# Patient Record
Sex: FEMALE | Race: WHITE | Employment: OTHER | URBAN - METROPOLITAN AREA
[De-identification: names, ages, dates, MRNs, and addresses within clinical notes are randomized per-mention and may not be internally consistent; named-entity substitution may affect disease eponyms.]

---

## 2017-11-13 ENCOUNTER — HOSPITAL ENCOUNTER (OUTPATIENT)
Dept: MAMMOGRAPHY | Age: 67
Discharge: HOME | End: 2017-11-13
Attending: OBSTETRICS & GYNECOLOGY | Admitting: OBSTETRICS & GYNECOLOGY

## 2017-11-13 DIAGNOSIS — Z12.31 VISIT FOR SCREENING MAMMOGRAM: ICD-10-CM

## 2018-06-13 ENCOUNTER — HOSPITAL ENCOUNTER (OUTPATIENT)
Dept: ULTRASOUND IMAGING | Age: 68
Discharge: HOME | End: 2018-06-13
Attending: OBSTETRICS & GYNECOLOGY | Admitting: OBSTETRICS & GYNECOLOGY

## 2018-06-13 DIAGNOSIS — N63.20 BREAST MASS, LEFT: ICD-10-CM

## 2018-06-18 ENCOUNTER — HOSPITAL ENCOUNTER (OUTPATIENT)
Dept: ULTRASOUND IMAGING | Age: 68
Discharge: HOME | End: 2018-06-18
Attending: OBSTETRICS & GYNECOLOGY | Admitting: OBSTETRICS & GYNECOLOGY

## 2018-06-18 DIAGNOSIS — N63.0 BREAST NODULE: ICD-10-CM

## 2018-12-06 ENCOUNTER — HOSPITAL ENCOUNTER (OUTPATIENT)
Dept: ULTRASOUND IMAGING | Age: 68
Discharge: HOME | End: 2018-12-06
Payer: MEDICARE

## 2018-12-06 ENCOUNTER — HOSPITAL ENCOUNTER (OUTPATIENT)
Dept: MAMMOGRAPHY | Age: 68
Discharge: HOME | End: 2018-12-06
Payer: MEDICARE

## 2018-12-06 DIAGNOSIS — N64.9 LESION OF BREAST: ICD-10-CM

## 2018-12-06 DIAGNOSIS — Z12.31 VISIT FOR SCREENING MAMMOGRAM: ICD-10-CM

## 2018-12-06 PROCEDURE — 77067 SCR MAMMO BI INCL CAD: CPT

## 2018-12-06 PROCEDURE — 76642 ULTRASOUND BREAST LIMITED: CPT

## 2019-12-09 ENCOUNTER — HOSPITAL ENCOUNTER (OUTPATIENT)
Dept: MAMMOGRAPHY | Age: 69
Discharge: HOME | End: 2019-12-09
Payer: MEDICARE

## 2019-12-09 DIAGNOSIS — Z12.31 VISIT FOR SCREENING MAMMOGRAM: ICD-10-CM

## 2019-12-09 PROCEDURE — 77063 BREAST TOMOSYNTHESIS BI: CPT

## 2021-04-13 ENCOUNTER — HOSPITAL ENCOUNTER (OUTPATIENT)
Dept: MAMMOGRAPHY | Age: 71
Discharge: HOME | End: 2021-04-13
Payer: MEDICARE

## 2021-04-13 VITALS — WEIGHT: 145 LBS | HEIGHT: 65 IN | BODY MASS INDEX: 24.16 KG/M2

## 2021-04-13 DIAGNOSIS — Z12.31 VISIT FOR SCREENING MAMMOGRAM: ICD-10-CM

## 2021-04-13 PROCEDURE — 77063 BREAST TOMOSYNTHESIS BI: CPT

## 2022-04-14 ENCOUNTER — HOSPITAL ENCOUNTER (OUTPATIENT)
Dept: WOMENS IMAGING | Age: 72
Discharge: HOME | End: 2022-04-14
Payer: MEDICARE

## 2022-04-14 DIAGNOSIS — Z12.31 VISIT FOR SCREENING MAMMOGRAM: ICD-10-CM

## 2022-04-14 PROCEDURE — 77063 BREAST TOMOSYNTHESIS BI: CPT

## 2022-05-15 ENCOUNTER — CLINICAL DOCUMENTATION (OUTPATIENT)
Dept: OTHER | Age: 72
End: 2022-05-15

## 2022-06-23 ENCOUNTER — TELEPHONE (OUTPATIENT)
Dept: ORTHOPEDIC SURGERY | Age: 72
End: 2022-06-23

## 2022-06-23 NOTE — TELEPHONE ENCOUNTER
Attempted to contact patient to inform them about the [facility] [address] joint pain program.    To provide optimal care, [facility], in collaboration with our Primary Care Providers, are excited to update our [facility] ([facility]) patients on our joint pain program.    In the past have you received injections in your knees? Or are you currently experiencing knee pain that is impacting your daily activities or quality of life? For more information about what [facility] [address] can offer to you or to set up an appointment with a joint pain specialist, please call us back at [phone].

## 2022-11-16 ENCOUNTER — OFFICE VISIT (OUTPATIENT)
Dept: ORTHOPEDIC SURGERY | Age: 72
End: 2022-11-16
Payer: MEDICARE

## 2022-11-16 VITALS — BODY MASS INDEX: 23.4 KG/M2 | HEIGHT: 66 IN

## 2022-11-16 DIAGNOSIS — M25.561 RIGHT KNEE PAIN, UNSPECIFIED CHRONICITY: Primary | ICD-10-CM

## 2022-11-16 DIAGNOSIS — M17.11 PRIMARY OSTEOARTHRITIS OF RIGHT KNEE: ICD-10-CM

## 2022-11-16 PROCEDURE — G8484 FLU IMMUNIZE NO ADMIN: HCPCS | Performed by: ORTHOPAEDIC SURGERY

## 2022-11-16 PROCEDURE — G8400 PT W/DXA NO RESULTS DOC: HCPCS | Performed by: ORTHOPAEDIC SURGERY

## 2022-11-16 PROCEDURE — 20610 DRAIN/INJ JOINT/BURSA W/O US: CPT | Performed by: ORTHOPAEDIC SURGERY

## 2022-11-16 PROCEDURE — 1123F ACP DISCUSS/DSCN MKR DOCD: CPT | Performed by: ORTHOPAEDIC SURGERY

## 2022-11-16 PROCEDURE — 3017F COLORECTAL CA SCREEN DOC REV: CPT | Performed by: ORTHOPAEDIC SURGERY

## 2022-11-16 PROCEDURE — 1036F TOBACCO NON-USER: CPT | Performed by: ORTHOPAEDIC SURGERY

## 2022-11-16 PROCEDURE — G8420 CALC BMI NORM PARAMETERS: HCPCS | Performed by: ORTHOPAEDIC SURGERY

## 2022-11-16 PROCEDURE — 1090F PRES/ABSN URINE INCON ASSESS: CPT | Performed by: ORTHOPAEDIC SURGERY

## 2022-11-16 PROCEDURE — G8428 CUR MEDS NOT DOCUMENT: HCPCS | Performed by: ORTHOPAEDIC SURGERY

## 2022-11-16 PROCEDURE — 99204 OFFICE O/P NEW MOD 45 MIN: CPT | Performed by: ORTHOPAEDIC SURGERY

## 2022-11-16 RX ORDER — LIDOCAINE HYDROCHLORIDE 10 MG/ML
20 INJECTION, SOLUTION INFILTRATION; PERINEURAL ONCE
Status: COMPLETED | OUTPATIENT
Start: 2022-11-16 | End: 2022-11-16

## 2022-11-16 RX ORDER — METHYLPREDNISOLONE ACETATE 40 MG/ML
40 INJECTION, SUSPENSION INTRA-ARTICULAR; INTRALESIONAL; INTRAMUSCULAR; SOFT TISSUE ONCE
Status: COMPLETED | OUTPATIENT
Start: 2022-11-16 | End: 2022-11-16

## 2022-11-16 RX ADMIN — LIDOCAINE HYDROCHLORIDE 20 ML: 10 INJECTION, SOLUTION INFILTRATION; PERINEURAL at 17:22

## 2022-11-16 RX ADMIN — METHYLPREDNISOLONE ACETATE 40 MG: 40 INJECTION, SUSPENSION INTRA-ARTICULAR; INTRALESIONAL; INTRAMUSCULAR; SOFT TISSUE at 17:23

## 2022-11-16 NOTE — PROGRESS NOTES
Date:  2022    Name:  Altaf Carbone  Address:  [address] [place] [address] [place] [address]    :  1950      Age:   71 y.o. Medical Record Number:  [id number]    Reason for Visit:    Chief Complaint    Knee Pain (Right knee)      DOS:2022     HPI: Bebeto De La Garza is a 71 y.o. female here today for right knee assessment  She states that she has had right knee pain for a few weeks localized to the medial joint line. She states that last year she was seen for her left knee at [facility] where she had a cement plasty for stress fracture in the medial tibial plateau. In addition to medial meniscus tear             ROS: All systems reviewed on patient intake form. Pertinent items are noted in HPI. No past medical history on file. No past surgical history on file. Family History   Problem Relation Age of Onset    Breast Cancer Paternal Aunt     Breast Cancer Paternal Aunt        Social History     Socioeconomic History    Marital status:    Tobacco Use    Smoking status: Never    Smokeless tobacco: Never   Vaping Use    Vaping Use: Unknown       No current outpatient medications on file. No current facility-administered medications for this visit. Not on File    Vital signs: There were no vitals taken for this visit. Right knee exam    Gait: No use of assistive devices. No antalgic gait. Alignment: normal alignment. Inspection/skin: Skin is intact without erythema or ecchymosis. No gross deformity. Palpation: Patellofemoral crepitus. Pain to palpation of medial joint line    Range of Motion: There is full range of motion. Strength: Normal quadriceps development. Effusion: No effusion or swelling present. Ligamentous stability: No cruciate or collateral ligament instability. Neurologic and vascular: Skin is warm and well-perfused. Sensation is intact to light-touch. Special tests: Negative Karlee sign.   Pain with and 4 mL of 0.5% ropivacaine (Naropin) were placed in the knee after it was prepped with chlorhexidine. This resulted in good relief of symptoms. There were no complications. The patient was advised to ice the knee this evening and to avoid vigorous activities for the next 2 days. They were advised to call us if there was any erythema, enduration, swelling or increasing pain. ..  Gerhardt Corolla is in agreement with this plan. All questions were answered to patient's satisfaction and was encouraged to call with any further questions. The patient was advised that NSAID-type medications have several potential side effects that include: gastrointestinal irritation including hemorrhage, renal injury, as well as an increased risk for heart attack and stroke. The patient was asked to take the medication with food and to stop if there is any symptoms of GI upset, including heartburn, nausea, increased gas or diarrhea. I asked the patient to contact their medical provider for vomiting, abdominal pain or black/bloody stools. The patient should have renal function testing per his medical provider periodically if the medication is taken on a regular basis. The patient should be alert for any swelling in the lower extremities and should stop taking the medication immediately and contact their medical provider should this occur. In addition, the patient should stop taking the medication immediately and contact their medical provider should there be any shortness of breath, fatigue and be evaluated in an emergency facility for any chest pain. The patient expresses understanding of these issues and questions were answered. Nasra Crespo MD [facility]    Orthopaedic Surgeon, Clinical Fellow  [address]   On behalf of      Total time spent for evaluation, education, and development of treatment plan: 49 minutes.     I attest that I met personally with the patient, performed the described exam, reviewed the radiographic studies and medical records associated with this patient and supervised the services that are described above.      Palmira Zamora MD

## 2022-11-29 ENCOUNTER — HOSPITAL ENCOUNTER (OUTPATIENT)
Dept: PHYSICAL THERAPY | Age: 72
Setting detail: THERAPIES SERIES
Discharge: HOME | End: 2022-11-29
Payer: MEDICARE

## 2022-11-29 PROCEDURE — 97112 NEUROMUSCULAR REEDUCATION: CPT

## 2022-11-29 PROCEDURE — 97110 THERAPEUTIC EXERCISES: CPT

## 2022-11-29 PROCEDURE — 97161 PT EVAL LOW COMPLEX 20 MIN: CPT

## 2022-11-29 NOTE — FLOWSHEET NOTE
The [facility] and [facility], [facility] [address], [address] [facility]  Phone: [phone]   Fax:     [phone]    Physical Therapy Daily Treatment Note  Date:  2022    Patient Name:  Killian Haskins    :  1950  MRN: [id number]  Restrictions/Precautions:    Medical/Treatment Diagnosis Information:  Diagnosis: M25.561 (ICD-10-CM) - Right knee pain, unspecified chronicity  Treatment Diagnosis: M25.561 (ICD-10-CM) - Right knee pain, unspecified chronicity  Insurance/Certification information:     Physician Information:     Has the plan of care been signed (Y/N):        []  Yes  [x]  No     Date of Patient follow up with Physician:       Is this a Progress Report:     []  Yes  [x]  No        If Yes:  Date Range for reporting period:  Beginning   Ending    Progress report will be due (10 Rx or 30 days whichever is less):       Recertification will be due (POC Duration  / 90 days whichever is less):         Visit # Insurance Allowable Auth Required   1 MN []  Yes [x]  No        Functional Scale: FOTO NV    Date assessed:       Latex Allergy:  [x]NO      []YES  Preferred Language for Healthcare:   [x]English       []other:    Pain level:  0-5-6/10     SUBJECTIVE:  See eval    OBJECTIVE: See eval  Observation:   Test measurements:      RESTRICTIONS/PRECAUTIONS:     Exercises/Interventions:   Exercise/Equipment Resistance/Repetitions Other comments   Stretching     Hamstring     Towel Pull     Calf @ wall 3 x 30\"    Hip Flexion     ITB     Groin     Quad                    SLR     Supine 2 x 10    Abduction     Adduction     Prone     SLR+          Isometrics     Quad sets 2 x 10 x 5\"         Patellar Glides     Medial     Superior     Inferior          ROM     Sheet Pulls     Hang Weights     Passive     Active     Weight Shift     Ankle Pumps                    CKC     Calf raises     Wall sits     Step ups     1 leg stand     Squatting     CC TKE     Balance     bridges 2 x 10 11/29        PRE     Extension  RANGE:   Flexion  RANGE:        Quantum machines     Leg press      Leg extension     Leg curl          Manual interventions                     Therapeutic Exercise and NMR EXR  [x] (97782) Provided verbal/tactile cueing for activities related to strengthening, flexibility, endurance, ROM for improvements in LE, proximal hip, and core control with self care, mobility, lifting, ambulation. [x] (49822) Provided verbal/tactile cueing for activities related to improving balance, coordination, kinesthetic sense, posture, motor skill, proprioception  to assist with LE, proximal hip, and core control in self care, mobility, lifting, ambulation and eccentric single leg control.      NMR and Therapeutic Activities:    [] (02815 or 09821) Provided verbal/tactile cueing for activities related to improving balance, coordination, kinesthetic sense, posture, motor skill, proprioception and motor activation to allow for proper function of core, proximal hip and LE with self care and ADLs  [] (53768) Gait Re-education- Provided training and instruction to the patient for proper LE, core and proximal hip recruitment and positioning and eccentric body weight control with ambulation re-education including up and down stairs     Home Exercise Program:    [x] (78788) Reviewed/Progressed HEP activities related to strengthening, flexibility, endurance, ROM of core, proximal hip and LE for functional self-care, mobility, lifting and ambulation/stair navigation   [] (90485)Reviewed/Progressed HEP activities related to improving balance, coordination, kinesthetic sense, posture, motor skill, proprioception of core, proximal hip and LE for self care, mobility, lifting, and ambulation/stair navigation      Manual Treatments:  PROM / STM / Oscillations-Mobs:  G-I, II, III, IV (PA's, Inf., Post.)  [] (13183) Provided manual therapy to mobilize LE, proximal hip and/or LS spine soft tissue/joints for the purpose of modulating pain, promoting relaxation,  increasing ROM, reducing/eliminating soft tissue swelling/inflammation/restriction, improving soft tissue extensibility and allowing for proper ROM for normal function with self care, mobility, lifting and ambulation. Modalities:      Charges:  Timed Code Treatment Minutes: 25   Total Treatment Minutes: 45       [x] EVAL (LOW) 74376 (typically 20 minutes face-to-face)  [] EVAL (MOD) 50975 (typically 30 minutes face-to-face)  [] EVAL (HIGH) 56830 (typically 45 minutes face-to-face)  [] RE-EVAL   [x] LP(18014) x  1   [] IONTO  [x] NMR (20185) x     [] VASO  [] Manual (90901) x      [] Other:  [] TA x      [] Mech Traction (05518)  [] ES(attended) (06133)      [] ES (un) (74722):     GOALS:     Short Term Goals: To be achieved in: 2 weeks  1. Independent in HEP and progression per patient tolerance, in order to prevent re-injury. [] Progressing: [] Met: [] Not Met: [] Adjusted   2. Patient will have a decrease in pain by one grade to facilitate improvement in movement, function, and ADLs as indicated by Functional Deficits. [] Progressing: [] Met: [] Not Met: [] Adjusted    Long Term Goals: To be achieved in: 6 weeks  1. FOTO score improved by 50% to assist with reaching prior level of function. [] Progressing: [] Met: [] Not Met: [] Adjusted  2. Patient will demonstrate increased AROM and flexibility to WNL to allow for proper joint functioning as indicated by patients Functional Deficits. [] Progressing: [] Met: [] Not Met: [] Adjusted  3. Patient will demonstrate an increase in RLE Strength to 5/5  to allow for proper functional mobility as indicated by patients Functional Deficits. [] Progressing: [] Met: [] Not Met: [] Adjusted  4. Patient will return to walking and all functional activities without restriction or increase in symptoms  [] Progressing: [] Met: [] Not Met: [] Adjusted  5. Patient will ascend/descend 12 stairs with reciprocal pattern without pain or symptoms  [] Progressing: [] Met: [] Not Met: [] Adjusted     Overall Progression Towards Functional goals/ Treatment Progress Update:  [] Patient is progressing as expected towards functional goals listed. [] Progression is slowed due to complexities/Impairments listed. [] Progression has been slowed due to co-morbidities. [x] Plan just implemented, too soon to assess goals progression <30days   [] Goals require adjustment due to lack of progress  [] Patient is not progressing as expected and requires additional follow up with physician  [] Other    Prognosis for POC: [x] Good [] Fair  [] Poor      Patient requires continued skilled intervention: [x] Yes  [] No    Treatment/Activity Tolerance:  [x] Patient able to complete treatment  [] Patient limited by fatigue  [] Patient limited by pain     [] Patient limited by other medical complications  [] Other:     Patient Education:   Patient educated on diagnosis, prognosis, POC, HEP           Access Code: [email or web address]        PLAN: See eval  [] Continue per plan of care [] Alter current plan (see comments above)  [x] Plan of care initiated [] Hold pending MD visit [] Discharge      Electronically signed by:  Lindsey Pond, PT, DPT, CSCS    Note: If patient does not return for scheduled/ recommended follow up visits, this note will serve as a discharge from care along with most recent update on progress.

## 2022-11-29 NOTE — PLAN OF CARE
[address]  Phone [phone]  Fax [phone]                                                       Physical Therapy Certification    Dear Dr. Platt ,    We had the pleasure of evaluating the following patient for physical therapy services at [address] [place] [address]. A summary of our findings can be found in the initial assessment below. This includes our plan of care. If you have any questions or concerns regarding these findings, please do not hesitate to contact me at the office phone number checked above. Thank you for the referral.       Physician Signature:_______________________________Date:__________________  By signing above (or electronic signature), therapists plan is approved by physician      Patient: Eneida Porter   : 1950   MRN: [id number]  Referring Physician:  Dr. Yong Warner      Evaluation Date: 2022      Medical Diagnosis Information:  Diagnosis: M25.561 (ICD-10-CM) - Right knee pain, unspecified chronicity   Treatment Diagnosis: M25.561 (ICD-10-CM) - Right knee pain, unspecified chronicity                                         Insurance information:       Precautions/ Contra-indications:     C-SSRS Triggered by Intake questionnaire (Past 2 wk assessment):   [x] No, Questionnaire did not trigger screening.   [] Yes, Patient intake triggered further evaluation      [] C-SSRS Screening completed  [] PCP notified via Plan of Care  [] Emergency services notified     Latex Allergy:  [x]NO      []YES  Preferred Language for Healthcare:   [x]English       []other:    SUBJECTIVE: Patient states that in September her knee started to bother her and denies a PETE.  Patient states she had a long drive down to [facility] which she thinks aggravated it and continues to notice on longer distance drives her knee tenses up and feels sore. Patient states that when going on walks on the inside of the knee pain would come and go. Patient states when she uses it she notices it hurts more and has progressively worsened Patient states she saw her doctor who gave her a cortisone shot and recommended PT. Relevant Medical History: stress fx left lower leg, HTN, OA  Functional Disability Index: FOTO NV    Pain Scale: 0-5-6/10  Easing factors: cortisone shot, ice, stretching in hot tub  Provocative factors: stairs, squatting, kneeling    Type: []Constant   []Intermittent  []Radiating []Localized []other: sharp     Numbness/Tingling: denies n/t     Occupation/School: None    Living Status/Prior Level of Function: Independent with ADLs and IADL's    OBJECTIVE:      Flexibility L R Comment   Hamstring  Decreased by 25%    Gastroc  Decreased by 25%    ITB      Quad              ROM PROM AROM Overpressure Comment    L R L R L R    Flexion WNL WNL WNL WNL      Extension WNL WNL WNL WNL                              Strength L R Comment   Quad 5/5 3/5    Hamstring 5/5 3/5    Gastroc 5/5 4+/5    Hip  flexion 5/5 4/5    Hip abd 5/5 3/5                    Special Test Results/Comment   Meniscal Click    Crepitus    Flexion Test -   Valgus Laxity    Varus Laxity    Lachmans    Drop Back    Homans    Step Down Test -        Girth L R   Mid Patella     Suprapatellar     5cm above     15cm above       Reflexes/Sensation:    [x]Dermatomes/Myotomes intact    []Reflexes equal and normal bilaterally   []Other:    Joint mobility:    [x]Normal    []Hypo   []Hyper    Palpation: Medial jt line    Functional Mobility/Transfers: Decreased stair negotiation, ambulation, sit to stand, squatting    Posture: FHRS    Bandages/Dressings/Incisions: n/a    Gait: (include devices/WB status) Decreased stance time on RLE    Orthopedic Special Tests: see above                       [x] Patient history, allergies, meds reviewed. Medical chart reviewed. See intake form. Review Of Systems (ROS):  [x]Performed Review of systems (Integumentary, CardioPulmonary, Neurological) by intake and observation. Intake form has been scanned into medical record. Patient has been instructed to contact their primary care physician regarding ROS issues if not already being addressed at this time. Co-morbidities/Complexities (which will affect course of rehabilitation):   []None           Arthritic conditions   []Rheumatoid arthritis (M05.9)  []Osteoarthritis (M19.91)   Cardiovascular conditions   [x]Hypertension (I10)  []Hyperlipidemia (E78.5)  []Angina pectoris (I20)  []Atherosclerosis (I70)   Musculoskeletal conditions   []Disc pathology   []Congenital spine pathologies   []Prior surgical intervention  []Osteoporosis (M81.8)  []Osteopenia (M85.8)   Endocrine conditions   []Hypothyroid (E03.9)  []Hyperthyroid Gastrointestinal conditions   []Constipation (T54.92)   Metabolic conditions   []Morbid obesity (E66.01)  []Diabetes type 1(E10.65) or 2 (E11.65)   []Neuropathy (G60.9)     Pulmonary conditions   []Asthma (J45)  []Coughing   []COPD (J44.9)   Psychological Disorders  []Anxiety (F41.9)  []Depression (F32.9)   []Other:   []Other:          Barriers to/and or personal factors that will affect rehab potential:              []Age  []Sex              []Motivation/Lack of Motivation                        []Co-Morbidities              []Cognitive Function, education/learning barriers              []Environmental, home barriers              []profession/work barriers  [x]past PT/medical experience  []other:  Justification:     Falls Risk Assessment (30 days):   [x] Falls Risk assessed and no intervention required.   [] Falls Risk assessed and Patient requires intervention due to being higher risk   TUG score (>12s at risk):     [] Falls education provided, including       G-Codes:       ASSESSMENT:   Functional Impairments:     []Noted lumbar/proximal hip/LE hypomobility   [x]Decreased LE functional ROM   [x]Decreased core/proximal hip strength and neuromuscular control   [x]Decreased LE functional strength   [x]Reduced balance/proprioceptive control   []other:      Functional Activity Limitations (from functional questionnaire and intake)   [x]Reduced ability to tolerate prolonged functional positions   [x]Reduced ability or difficulty with changes of positions or transfers between positions   [x]Reduced ability to maintain good posture and demonstrate good body mechanics with sitting, bending, and lifting   []Reduced ability to sleep   [x] Reduced ability or tolerance with driving and/or computer work   [x]Reduced ability to perform lifting, carrying tasks   [x]Reduced ability to squat   [x]Reduced ability to forward bend   [x]Reduced ability to ambulate prolonged functional periods/distances/surfaces   [x]Reduced ability to ascend/descend stairs   [x]Reduced ability to run, hop or jump   []other:     Participation Restrictions   []Reduced participation in self care activities   []Reduced participation in home management activities   []Reduced participation in work activities   []Reduced participation in social activities. []Reduced participation in sport activities. Classification :    []Signs/symptoms consistent with post-surgical status including decreased ROM, strength and function.    []Signs/symptoms consistent with joint sprain/strain   []Signs/symptoms consistent with patella-femoral syndrome   [x]Signs/symptoms consistent with knee OA/hip OA   []Signs/symptoms consistent with internal derangement of knee/Hip   []Signs/symptoms consistent with functional hip weakness/NMR control      []Signs/symptoms consistent with tendinitis/tendinosis    []signs/symptoms consistent with pathology which may benefit from Dry needling      []other:      Prognosis/Rehab Potential:      []Excellent   [x]Good    []Fair   []Poor    Tolerance of evaluation/treatment: []Excellent   [x]Good    []Fair   []Poor    Physical Therapy Evaluation Complexity Justification  [x] A history of present problem with:  [] no personal factors and/or comorbidities that impact the plan of care;  [x]1-2 personal factors and/or comorbidities that impact the plan of care  []3 personal factors and/or comorbidities that impact the plan of care  [x] An examination of body systems using standardized tests and measures addressing any of the following: body structures and functions (impairments), activity limitations, and/or participation restrictions;:  [x] a total of 1-2 or more elements   [] a total of 3 or more elements   [] a total of 4 or more elements   [x] A clinical presentation with:  [x] stable and/or uncomplicated characteristics   [] evolving clinical presentation with changing characteristics  [] unstable and unpredictable characteristics;   [x] Clinical decision making of [] low, [] moderate, [] high complexity using standardized patient assessment instrument and/or measurable assessment of functional outcome. [x] EVAL (LOW) 18229 (typically 20 minutes face-to-face)  [] EVAL (MOD) 49770 (typically 30 minutes face-to-face)  [] EVAL (HIGH) 23377 (typically 45 minutes face-to-face)  [] RE-EVAL       PLAN  Frequency/Duration:  2 days per week for 6 Weeks:  Interventions:  [x]  Therapeutic exercise including: strength training, ROM, for Lower extremity and core   [x]  NMR activation and proprioception for LE, Glutes and Core   [x]  Manual therapy as indicated for LE, Hip and spine to include: Dry Needling/IASTM, STM, PROM, Gr I-IV mobilizations, manipulation. [x] Modalities as needed that may include: thermal agents, E-stim, Biofeedback, US, iontophoresis as indicated  [x] Patient education on joint protection, postural re-education, activity modification, progression of HEP. HEP instruction    Access Code: [email or web address]  URL: [email or web address]  Date: 11/29/2022  Prepared by: Lindsey Pond    Exercises  Standing Gastroc Stretch - 1 x daily - 7 x weekly - 1 sets - 3 reps - 30 sec hold  Supine Bridge - 1 x daily - 7 x weekly - 2 sets - 10 reps  Supine Quad Set - 1 x daily - 7 x weekly - 2 sets - 10 reps - 5 sec hold  Supine Active Straight Leg Raise - 1 x daily - 7 x weekly - 2 sets - 10 reps      GOALS:   Patient stated goal: To be able to walk without pain  [] Progressing: [] Met: [] Not Met: [] Adjusted    Therapist goals for Patient:   Short Term Goals: To be achieved in: 2 weeks  1. Independent in HEP and progression per patient tolerance, in order to prevent re-injury. [] Progressing: [] Met: [] Not Met: [] Adjusted   2. Patient will have a decrease in pain by one grade to facilitate improvement in movement, function, and ADLs as indicated by Functional Deficits. [] Progressing: [] Met: [] Not Met: [] Adjusted    Long Term Goals: To be achieved in: 6 weeks  1. FOTO score improved by 50% to assist with reaching prior level of function. [] Progressing: [] Met: [] Not Met: [] Adjusted  2. Patient will demonstrate increased AROM and flexibility to WNL to allow for proper joint functioning as indicated by patients Functional Deficits. [] Progressing: [] Met: [] Not Met: [] Adjusted  3. Patient will demonstrate an increase in RLE Strength to 5/5  to allow for proper functional mobility as indicated by patients Functional Deficits. [] Progressing: [] Met: [] Not Met: [] Adjusted  4. Patient will return to walking and all functional activities without restriction or increase in symptoms  [] Progressing: [] Met: [] Not Met: [] Adjusted  5.  Patient will ascend/descend 12 stairs with reciprocal pattern without pain or symptoms  [] Progressing: [] Met: [] Not Met: [] Adjusted       Electronically signed by:  Lindsey Pond, PT, DPT, CSCS    Note: If patient does not return for scheduled/ recommended follow up visits, this note will serve as a discharge from care along with most recent update on progress.

## 2022-12-06 ENCOUNTER — APPOINTMENT (OUTPATIENT)
Dept: PHYSICAL THERAPY | Age: 72
End: 2022-12-06
Payer: MEDICARE

## 2022-12-09 ENCOUNTER — HOSPITAL ENCOUNTER (OUTPATIENT)
Dept: PHYSICAL THERAPY | Age: 72
Setting detail: THERAPIES SERIES
Discharge: HOME | End: 2022-12-09
Payer: MEDICARE

## 2022-12-09 PROCEDURE — 97112 NEUROMUSCULAR REEDUCATION: CPT

## 2022-12-09 PROCEDURE — 97110 THERAPEUTIC EXERCISES: CPT

## 2022-12-09 NOTE — FLOWSHEET NOTE
[facility],  [address]  Phone: [phone]   Fax:     [phone]    Physical Therapy Daily Treatment Note  Date:  2022    Patient Name:  Toan Garcia    :  1950  MRN: [id number]  Restrictions/Precautions:    Medical/Treatment Diagnosis Information:  Diagnosis: M25.561 (ICD-10-CM) - Right knee pain, unspecified chronicity  Treatment Diagnosis: M25.561 (ICD-10-CM) - Right knee pain, unspecified chronicity  Insurance/Certification information:     Physician Information:     Has the plan of care been signed (Y/N):        []  Yes  [x]  No     Date of Patient follow up with Physician:       Is this a Progress Report:     []  Yes  [x]  No        If Yes:  Date Range for reporting period:  Beginning   Ending    Progress report will be due (10 Rx or 30 days whichever is less):       Recertification will be due (POC Duration  / 90 days whichever is less):         Visit # Insurance Allowable Auth Required   2 MN []  Yes [x]  No        Functional Scale: FOTO NV    Date assessed:       Latex Allergy:  [x]NO      []YES  Preferred Language for Healthcare:   [x]English       []other:    Pain level:  5/10    SUBJECTIVE:  Patient states she has been doing her exercises at home still feels pain     OBJECTIVE:  R Knee Ext strength 3-/5  Observation:   Test measurements:      RESTRICTIONS/PRECAUTIONS:     Exercises/Interventions:   Exercise/Equipment Resistance/Repetitions Other comments   Stretching     Hamstring     Towel Pull     Calf @ wall 3 x 30\"    Hip Flexion     ITB     Groin     Quad                    SLR     Supine 2 x 10    Abduction 2 x 10 12/8   Adduction     Prone               Isometrics     Quad sets 2 x 10 x 5\"         Patellar Glides     Medial     Superior     Inferior          ROM     Sheet Pulls     Hang Weights     Passive     Active     Weight Shift Ankle Pumps                    CKC     Calf raises     Wall sits     Step ups     1 leg stand     Squatting      TKE 2 x 10 12/9   Balance     bridges 2 x 10 11/29        PRE     Extension  RANGE:   Flexion  RANGE:        Quantum machines     Leg press      Leg extension     Leg curl          Manual interventions                     Therapeutic Exercise and NMR EXR  [x] (58092) Provided verbal/tactile cueing for activities related to strengthening, flexibility, endurance, ROM for improvements in LE, proximal hip, and core control with self care, mobility, lifting, ambulation. [x] (66112) Provided verbal/tactile cueing for activities related to improving balance, coordination, kinesthetic sense, posture, motor skill, proprioception  to assist with LE, proximal hip, and core control in self care, mobility, lifting, ambulation and eccentric single leg control.      NMR and Therapeutic Activities:    [] (69049 or 05294) Provided verbal/tactile cueing for activities related to improving balance, coordination, kinesthetic sense, posture, motor skill, proprioception and motor activation to allow for proper function of core, proximal hip and LE with self care and ADLs  [] (07084) Gait Re-education- Provided training and instruction to the patient for proper LE, core and proximal hip recruitment and positioning and eccentric body weight control with ambulation re-education including up and down stairs     Home Exercise Program:    [x] (91830) Reviewed/Progressed HEP activities related to strengthening, flexibility, endurance, ROM of core, proximal hip and LE for functional self-care, mobility, lifting and ambulation/stair navigation   [] (06044)Reviewed/Progressed HEP activities related to improving balance, coordination, kinesthetic sense, posture, motor skill, proprioception of core, proximal hip and LE for self care, mobility, lifting, and ambulation/stair navigation      Manual Treatments:  PROM / STM / Oscillations-Mobs:  G-I, II, III, IV (PA's, Inf., Post.)  [] (85029) Provided manual therapy to mobilize LE, proximal hip and/or LS spine soft tissue/joints for the purpose of modulating pain, promoting relaxation,  increasing ROM, reducing/eliminating soft tissue swelling/inflammation/restriction, improving soft tissue extensibility and allowing for proper ROM for normal function with self care, mobility, lifting and ambulation. Modalities:      Charges:  Timed Code Treatment Minutes: 45   Total Treatment Minutes: 45       [] EVAL (LOW) 76589 (typically 20 minutes face-to-face)  [] EVAL (MOD) 51746 (typically 30 minutes face-to-face)  [] EVAL (HIGH) 95818 (typically 45 minutes face-to-face)  [] RE-EVAL   [x] CW(14045) x  2   [] IONTO  [x] NMR (01786) x  1   [] VASO  [] Manual (54350) x      [] Other:  [] TA x      [] Mech Traction (82773)  [] ES(attended) (42530)      [] ES (un) (10427):     GOALS:     Short Term Goals: To be achieved in: 2 weeks  1. Independent in HEP and progression per patient tolerance, in order to prevent re-injury. [] Progressing: [] Met: [] Not Met: [] Adjusted   2. Patient will have a decrease in pain by one grade to facilitate improvement in movement, function, and ADLs as indicated by Functional Deficits. [] Progressing: [] Met: [] Not Met: [] Adjusted    Long Term Goals: To be achieved in: 6 weeks  1. FOTO score improved by 50% to assist with reaching prior level of function. [] Progressing: [] Met: [] Not Met: [] Adjusted  2. Patient will demonstrate increased AROM and flexibility to WNL to allow for proper joint functioning as indicated by patients Functional Deficits. [] Progressing: [] Met: [] Not Met: [] Adjusted  3. Patient will demonstrate an increase in RLE Strength to 5/5  to allow for proper functional mobility as indicated by patients Functional Deficits. [] Progressing: [] Met: [] Not Met: [] Adjusted  4.  Patient will return to walking and all functional activities without restriction or increase in symptoms  [] Progressing: [] Met: [] Not Met: [] Adjusted  5. Patient will ascend/descend 12 stairs with reciprocal pattern without pain or symptoms  [] Progressing: [] Met: [] Not Met: [] Adjusted     Overall Progression Towards Functional goals/ Treatment Progress Update:  [] Patient is progressing as expected towards functional goals listed. [] Progression is slowed due to complexities/Impairments listed. [] Progression has been slowed due to co-morbidities. [x] Plan just implemented, too soon to assess goals progression <30days   [] Goals require adjustment due to lack of progress  [] Patient is not progressing as expected and requires additional follow up with physician  [] Other    Prognosis for POC: [x] Good [] Fair  [] Poor      Patient requires continued skilled intervention: [x] Yes  [] No    Treatment/Activity Tolerance:  [x] Patient able to complete treatment  [] Patient limited by fatigue  [] Patient limited by pain     [] Patient limited by other medical complications  [] Other: Patient with good toleration to initial session. Has been consistent with HEP. Full ROM noted actively and passively. Increased Ocand CC TE ith good tolerance. Updated HEP    Patient Education:   Patient educated on diagnosis, prognosis, POC, HEP           Access Code: [email or web address]        PLAN: See eval  [] Continue per plan of care [] Alter current plan (see comments above)  [x] Plan of care initiated [] Hold pending MD visit [] Discharge      Electronically signed by:  Lindsey Pond PT, DPT, CSCS    Note: If patient does not return for scheduled/ recommended follow up visits, this note will serve as a discharge from care along with most recent update on progress.

## 2022-12-13 ENCOUNTER — HOSPITAL ENCOUNTER (OUTPATIENT)
Dept: PHYSICAL THERAPY | Age: 72
Setting detail: THERAPIES SERIES
Discharge: HOME | End: 2022-12-13
Payer: MEDICARE

## 2022-12-13 PROCEDURE — 97112 NEUROMUSCULAR REEDUCATION: CPT

## 2022-12-13 PROCEDURE — 97110 THERAPEUTIC EXERCISES: CPT

## 2022-12-13 NOTE — FLOWSHEET NOTE
[facility], [address] [place] [address], [address] [place] [address]  Phone: [phone]   Fax:     [phone]    Physical Therapy Daily Treatment Note  Date:  2022    Patient Name:  Eliud Cross    :  1950  MRN: [id number]  Restrictions/Precautions:    Medical/Treatment Diagnosis Information:  Diagnosis: M25.561 (ICD-10-CM) - Right knee pain, unspecified chronicity  Treatment Diagnosis: M25.561 (ICD-10-CM) - Right knee pain, unspecified chronicity  Insurance/Certification information:     Physician Information:   Sammi Sunshine  Has the plan of care been signed (Y/N):        []  Yes  [x]  No     Date of Patient follow up with Physician:       Is this a Progress Report:     []  Yes  [x]  No        If Yes:  Date Range for reporting period:  Beginning   Ending    Progress report will be due (10 Rx or 30 days whichever is less):       Recertification will be due (POC Duration  / 90 days whichever is less):         Visit # Insurance Allowable Auth Required   3 MN []  Yes [x]  No        Functional Scale: FOTO NV    Date assessed:       Latex Allergy:  [x]NO      []YES  Preferred Language for Healthcare:   [x]English       []other:    Pain level:  3-4/10    SUBJECTIVE:  Patient states that she has noticed some improvement which she is happy about    OBJECTIVE:  R Glute Med Strength 3-/5  Observation:   Test measurements:      RESTRICTIONS/PRECAUTIONS:     Exercises/Interventions:   Exercise/Equipment Resistance/Repetitions Other comments   Stretching     Hamstring     Towel Pull     Calf @ wall 3 x 30\"    Hip Flexion     ITB     Groin     Quad                    SLR     Supine 2 x 10 1# 12/13   Abduction 2 x 101 # 12/13   Adduction     Prone               Isometrics     Quad sets 2 x 10 x 5\"    S/L Clamshells 2 x 10 green 12/13   Patellar Glides     Medial     Superior     Inferior          ROM     Sheet Pulls Hang Weights     Passive     Active     Weight Shift     Ankle Pumps                    CKC     Calf raises     Wall sits     Step ups 2 x 10 12/13   1 leg stand     Squatting      TKE 2 x 10 12/9   Balance     bridges 3 x 10 ^ 12/13        PRE     Extension  RANGE:   Flexion  RANGE:        Quantum machines     Leg press      Leg extension     Leg curl          Manual interventions                     Therapeutic Exercise and NMR EXR  [x] (69909) Provided verbal/tactile cueing for activities related to strengthening, flexibility, endurance, ROM for improvements in LE, proximal hip, and core control with self care, mobility, lifting, ambulation. [x] (04569) Provided verbal/tactile cueing for activities related to improving balance, coordination, kinesthetic sense, posture, motor skill, proprioception  to assist with LE, proximal hip, and core control in self care, mobility, lifting, ambulation and eccentric single leg control.      NMR and Therapeutic Activities:    [] (98148 or 51685) Provided verbal/tactile cueing for activities related to improving balance, coordination, kinesthetic sense, posture, motor skill, proprioception and motor activation to allow for proper function of core, proximal hip and LE with self care and ADLs  [] (44852) Gait Re-education- Provided training and instruction to the patient for proper LE, core and proximal hip recruitment and positioning and eccentric body weight control with ambulation re-education including up and down stairs     Home Exercise Program:    [x] (25887) Reviewed/Progressed HEP activities related to strengthening, flexibility, endurance, ROM of core, proximal hip and LE for functional self-care, mobility, lifting and ambulation/stair navigation   [] (05977)Reviewed/Progressed HEP activities related to improving balance, coordination, kinesthetic sense, posture, motor skill, proprioception of core, proximal hip and LE for self care, mobility, lifting, and ambulation/stair navigation      Manual Treatments:  PROM / STM / Oscillations-Mobs:  G-I, II, III, IV (PA's, Inf., Post.)  [] (13394) Provided manual therapy to mobilize LE, proximal hip and/or LS spine soft tissue/joints for the purpose of modulating pain, promoting relaxation,  increasing ROM, reducing/eliminating soft tissue swelling/inflammation/restriction, improving soft tissue extensibility and allowing for proper ROM for normal function with self care, mobility, lifting and ambulation. Modalities:      Charges:  Timed Code Treatment Minutes: 45   Total Treatment Minutes: 45       [] EVAL (LOW) 89978 (typically 20 minutes face-to-face)  [] EVAL (MOD) 97838 (typically 30 minutes face-to-face)  [] EVAL (HIGH) 00416 (typically 45 minutes face-to-face)  [] RE-EVAL   [x] BR(24138) x  2   [] IONTO  [x] NMR (17652) x  1   [] VASO  [] Manual (37727) x      [] Other:  [] TA x      [] Mech Traction (84855)  [] ES(attended) (29202)      [] ES (un) (31046):     GOALS:     Short Term Goals: To be achieved in: 2 weeks  1. Independent in HEP and progression per patient tolerance, in order to prevent re-injury. [] Progressing: [] Met: [] Not Met: [] Adjusted   2. Patient will have a decrease in pain by one grade to facilitate improvement in movement, function, and ADLs as indicated by Functional Deficits. [] Progressing: [] Met: [] Not Met: [] Adjusted    Long Term Goals: To be achieved in: 6 weeks  1. FOTO score improved by 50% to assist with reaching prior level of function. [] Progressing: [] Met: [] Not Met: [] Adjusted  2. Patient will demonstrate increased AROM and flexibility to WNL to allow for proper joint functioning as indicated by patients Functional Deficits. [] Progressing: [] Met: [] Not Met: [] Adjusted  3. Patient will demonstrate an increase in RLE Strength to 5/5  to allow for proper functional mobility as indicated by patients Functional Deficits.    [] Progressing: [] Met: [] Not Met: [] Adjusted  4. Patient will return to walking and all functional activities without restriction or increase in symptoms  [] Progressing: [] Met: [] Not Met: [] Adjusted  5. Patient will ascend/descend 12 stairs with reciprocal pattern without pain or symptoms  [] Progressing: [] Met: [] Not Met: [] Adjusted     Overall Progression Towards Functional goals/ Treatment Progress Update:  [] Patient is progressing as expected towards functional goals listed. [] Progression is slowed due to complexities/Impairments listed. [] Progression has been slowed due to co-morbidities. [x] Plan just implemented, too soon to assess goals progression <30days   [] Goals require adjustment due to lack of progress  [] Patient is not progressing as expected and requires additional follow up with physician  [] Other    Prognosis for POC: [x] Good [] Fair  [] Poor      Patient requires continued skilled intervention: [x] Yes  [] No    Treatment/Activity Tolerance:  [x] Patient able to complete treatment  [] Patient limited by fatigue  [] Patient limited by pain     [] Patient limited by other medical complications  [] Other: Improvement in signs and symptoms since last visit although still feels her knee on occasion. Increased SLR to 1# with good tolerance although muscular fatigue noted. Initiated closed chain strengthening with good tolerance and without c/o pain. Reviewed HEP    Patient Education:   Patient educated on diagnosis, prognosis, POC, HEP           Access Code: [email or web address]        PLAN: See eval  [x] Continue per plan of care [] Alter current plan (see comments above)  [] Plan of care initiated [] Hold pending MD visit [] Discharge      Electronically signed by:  Alexys Pond, PT, DPT, CSCS    Note: If patient does not return for scheduled/ recommended follow up visits, this note will serve as a discharge from care along with most recent update on progress.

## 2023-02-20 ENCOUNTER — OFFICE VISIT (OUTPATIENT)
Dept: ORTHOPEDIC SURGERY | Age: 73
End: 2023-02-20
Payer: MEDICARE

## 2023-02-20 VITALS — WEIGHT: 145 LBS | BODY MASS INDEX: 23.3 KG/M2 | HEIGHT: 66 IN

## 2023-02-20 DIAGNOSIS — M17.11 PRIMARY OSTEOARTHRITIS OF RIGHT KNEE: Primary | ICD-10-CM

## 2023-02-20 PROCEDURE — 1090F PRES/ABSN URINE INCON ASSESS: CPT | Performed by: ORTHOPAEDIC SURGERY

## 2023-02-20 PROCEDURE — 20610 DRAIN/INJ JOINT/BURSA W/O US: CPT | Performed by: ORTHOPAEDIC SURGERY

## 2023-02-20 PROCEDURE — G8420 CALC BMI NORM PARAMETERS: HCPCS | Performed by: ORTHOPAEDIC SURGERY

## 2023-02-20 PROCEDURE — G8427 DOCREV CUR MEDS BY ELIG CLIN: HCPCS | Performed by: ORTHOPAEDIC SURGERY

## 2023-02-20 PROCEDURE — 1123F ACP DISCUSS/DSCN MKR DOCD: CPT | Performed by: ORTHOPAEDIC SURGERY

## 2023-02-20 PROCEDURE — 99214 OFFICE O/P EST MOD 30 MIN: CPT | Performed by: ORTHOPAEDIC SURGERY

## 2023-02-20 PROCEDURE — 3017F COLORECTAL CA SCREEN DOC REV: CPT | Performed by: ORTHOPAEDIC SURGERY

## 2023-02-20 PROCEDURE — G8400 PT W/DXA NO RESULTS DOC: HCPCS | Performed by: ORTHOPAEDIC SURGERY

## 2023-02-20 PROCEDURE — 1036F TOBACCO NON-USER: CPT | Performed by: ORTHOPAEDIC SURGERY

## 2023-02-20 PROCEDURE — G8484 FLU IMMUNIZE NO ADMIN: HCPCS | Performed by: ORTHOPAEDIC SURGERY

## 2023-02-20 RX ORDER — METHYLPREDNISOLONE ACETATE 40 MG/ML
40 INJECTION, SUSPENSION INTRA-ARTICULAR; INTRALESIONAL; INTRAMUSCULAR; SOFT TISSUE ONCE
Status: COMPLETED | OUTPATIENT
Start: 2023-02-20 | End: 2023-02-20

## 2023-02-20 RX ADMIN — METHYLPREDNISOLONE ACETATE 40 MG: 40 INJECTION, SUSPENSION INTRA-ARTICULAR; INTRALESIONAL; INTRAMUSCULAR; SOFT TISSUE at 17:29

## 2023-02-20 NOTE — PROGRESS NOTES
Date:  2023    Name:  Toan Garcia  Address:  [address] [place] [address]    :  1950      Age:   68 y.o.    SSN:        Medical Record Number:  [id number]    Reason for Visit:    Chief Complaint    Knee Pain (FU R Knee)      DOS:2023     HPI: Arianna Aguirre is a 68 y.o. female here today for a follow up evaluation of her right knee. She was previously diagnosed with osteoarthritis. We referred her to physical therapy and underwent an intraarticular steroid injection on 2022. She reports she was doing ok until recently. Patient now reports an exacerbation of symptoms with an increase in pain. She denies any new injuries. Her pain is intermittent and localized medially. The pain does not wake her at night. She takes oral and topical anti-inflammatories as needed. Of note, patient does note lower back pain and is not sure if it is contributing to her current knee pain. ROS: All systems reviewed on patient intake form. Pertinent items are noted in HPI. No past medical history on file. No past surgical history on file. Family History   Problem Relation Age of Onset    Breast Cancer Paternal Aunt     Breast Cancer Paternal Aunt        Social History     Socioeconomic History    Marital status:    Tobacco Use    Smoking status: Never    Smokeless tobacco: Never   Vaping Use    Vaping Use: Unknown       No current outpatient medications on file. No current facility-administered medications for this visit. Allergies   Allergen Reactions    Anaprox [Naproxen]        Vital signs:  Ht 5' 6\" (1.676 m)   Wt 145 lb (65.8 kg)   BMI 23.40 kg/m²        Neuro: Alert & oriented x 3,  normal,  no focal deficits noted. Normal affect.   Eyes: sclera clear  Ears: Normal external ear  Mouth:  No perioral lesions  Pulm: Respirations unlabored and regular  Pulse: Regular rate    Skin: Warm, well perfused        Right knee exam     Gait: No use of assistive devices. No antalgic gait. Alignment: normal alignment. Inspection/skin: Skin is intact without erythema or ecchymosis. No gross deformity. Palpation: Patellofemoral crepitus. Pain to palpation of medial joint line. Tender over patella tendon. Range of Motion: There is full range of motion. Strength: Normal quadriceps development. Effusion: No effusion or swelling present. Ligamentous stability: No cruciate or collateral ligament instability. Neurologic and vascular: Skin is warm and well-perfused. Sensation is intact to light-touch. Special tests: Negative Karlee sign. Pain with deep flexion        Left knee exam     Gait: No use of assistive devices. No antalgic gait. Alignment: normal alignment. Inspection/skin: Skin is intact without erythema or ecchymosis. No gross deformity. Palpation: no crepitus. no joint line tenderness present. Range of Motion: There is full range of motion. Strength: Normal quadriceps development. Effusion: No effusion or swelling present. Ligamentous stability: No cruciate or collateral ligament instability. Neurologic and vascular: Skin is warm and well-perfused. Sensation is intact to light-touch. Special tests: Negative Karlee sign. Diagnostics:  Radiology:     No new imaging obtained in the office today       Assessment: 78-year-old female with bilateral degenerative knee OA presents for right side symptomatic    Plan: The nature and natural history of osteoarthritis was discussed in detail the patient today. Treatment options both surgical and nonsurgical were discussed in detail. Patient was counseled with regard to the importance of activity modification as well as weight control. The role for medications, intra-articular injections as well as surgery were discussed. Patient's questions were answered.      Patient returns back to the office with exacerbation of symptoms. The injection she received back in November provided relief and she was able to perform her exercises. Therefore, we believe patient is a candidate for repeat steroid injection and continuation of her exercises. We will also get her fitted for a  genutrain brace to wear when she is up and about. Follow up will be as needed . Volodymyr Grove is in agreement with this plan. All questions were answered to patient's satisfaction and was encouraged to call with any further questions. The indications and risks of steroid injection as well as treatment alternatives were discussed with the patient who consented to the procedure. Under sterile conditions and after informed consent was obtained the patient was given an injection into the right knee. 2cc 40 mg of Depo-Medrol and 4 mL of 1% lidocaine were placed in the knee after it was prepped with chlorhexidine. This resulted in good relief of symptoms. There were no complications. The patient was advised to ice the knee this evening and to avoid vigorous activities for the next 2 days. They were advised to call us if there was any erythema, enduration, swelling or increasing pain. Total time spent for evaluation, education, and development of treatment plan: 38 minutes     I Brando Escalera CMA, am scribing for and in the presence of Dr. Susana Newman MD.    2/20/23 3:02 PM  Brando Escalera CMA       No orders of the defined types were placed in this encounter. I attest that I met personally with the patient, performed the described exam, reviewed the radiographic studies and medical records associated with this patient and supervised the services that are described above.      Ronnell Rivera MD

## 2023-05-01 ENCOUNTER — OFFICE VISIT (OUTPATIENT)
Dept: ORTHOPEDIC SURGERY | Age: 73
End: 2023-05-01

## 2023-05-01 VITALS — WEIGHT: 158 LBS | BODY MASS INDEX: 26.33 KG/M2 | HEIGHT: 65 IN

## 2023-05-01 DIAGNOSIS — M17.11 PRIMARY OSTEOARTHRITIS OF RIGHT KNEE: Primary | ICD-10-CM

## 2023-05-01 NOTE — PROGRESS NOTES
Shellfish-Derived Products Anaphylaxis       Vital signs:  Ht 5' 5\" (1.651 m)   Wt 158 lb (71.7 kg)   BMI 26.29 kg/m²            Right knee exam     Gait: No use of assistive devices. No antalgic gait. Alignment: normal alignment. Inspection/skin: Skin is intact without erythema or ecchymosis. No gross deformity. Palpation: Patellofemoral crepitus. Pain to palpation of medial joint line. Range of Motion: 0 - 110 degrees of flexion. Strength: Normal quadriceps development. Effusion: No effusion or swelling present. Ligamentous stability: No cruciate or collateral ligament instability. Neurologic and vascular: Skin is warm and well-perfused. Sensation is intact to light-touch. Special tests: Negative Karlee sign. Pain with deep flexion        Left knee exam     Gait: No use of assistive devices. No antalgic gait. Alignment: normal alignment. Inspection/skin: Skin is intact without erythema or ecchymosis. No gross deformity. Palpation: no crepitus. no joint line tenderness present. Range of Motion: There is full range of motion. Strength: Normal quadriceps development. Effusion: No effusion or swelling present. Ligamentous stability: No cruciate or collateral ligament instability. Neurologic and vascular: Skin is warm and well-perfused. Sensation is intact to light-touch. Special tests: Negative Karlee sign. Radiology:     Pertinent imaging was interpreted and reviewed with the patient. No new imaging was obtained during today's visit. Assessment :  27-year-old female with bilateral degenerative knee OA presents for right side symptomatic    Impression:  Encounter Diagnosis   Name Primary?     Primary osteoarthritis of right knee Yes       Office Procedures:  Orders Placed This Encounter   Procedures    DUROLANE INJECTION (For Auth/Precert)     Standing Status:   Future     Standing Expiration Date:   5/1/2024

## 2023-05-12 ENCOUNTER — OFFICE VISIT (OUTPATIENT)
Dept: ORTHOPEDIC SURGERY | Age: 73
End: 2023-05-12

## 2023-05-12 VITALS — WEIGHT: 158 LBS | HEIGHT: 65 IN | BODY MASS INDEX: 26.33 KG/M2

## 2023-05-12 DIAGNOSIS — M17.11 PRIMARY OSTEOARTHRITIS OF RIGHT KNEE: Primary | ICD-10-CM

## 2023-07-19 ENCOUNTER — HOSPITAL ENCOUNTER (OUTPATIENT)
Dept: MAMMOGRAPHY | Age: 73
Discharge: HOME | End: 2023-07-19
Payer: MEDICARE

## 2023-07-19 VITALS — BODY MASS INDEX: 25.33 KG/M2 | WEIGHT: 152 LBS | HEIGHT: 65 IN

## 2023-07-19 DIAGNOSIS — Z12.31 VISIT FOR SCREENING MAMMOGRAM: ICD-10-CM

## 2023-07-19 PROCEDURE — 77067 SCR MAMMO BI INCL CAD: CPT

## 2023-09-06 ENCOUNTER — OFFICE VISIT (OUTPATIENT)
Dept: ORTHOPEDIC SURGERY | Age: 73
End: 2023-09-06

## 2023-09-06 VITALS — WEIGHT: 158 LBS | BODY MASS INDEX: 26.33 KG/M2 | HEIGHT: 65 IN

## 2023-09-06 DIAGNOSIS — M17.11 PRIMARY OSTEOARTHRITIS OF RIGHT KNEE: Primary | ICD-10-CM

## 2023-09-06 RX ORDER — METHYLPREDNISOLONE ACETATE 40 MG/ML
40 INJECTION, SUSPENSION INTRA-ARTICULAR; INTRALESIONAL; INTRAMUSCULAR; SOFT TISSUE ONCE
Status: COMPLETED | OUTPATIENT
Start: 2023-09-06 | End: 2023-09-06

## 2023-09-06 RX ADMIN — METHYLPREDNISOLONE ACETATE 40 MG: 40 INJECTION, SUSPENSION INTRA-ARTICULAR; INTRALESIONAL; INTRAMUSCULAR; SOFT TISSUE at 18:18

## 2023-09-06 NOTE — PROGRESS NOTES
Chief Complaint  Follow-up (Right knee )      History of Present Illness:  Renay Pearson is a pleasant 68 y.o. female who presents today for follow up evaluation of right knee pain. She has known osteoarthritis. We have been treating her conservatively consistent of voltaren gel, formal, supervised physical therapy, genutrain, corticosteroid injection on 2/20/2023 and Durolane injection on 5/12/2023. She returns today with an exacerbation of symptoms. Pain is worse with walking. Denies mechanical symptoms. The pain does not keep her up at night. Denies any new injuries. She also complains of some lumbar spine pain today. Medical History:  Patient's medications, allergies, past medical, surgical, social and family histories were reviewed and updated as appropriate. Pertinent items are noted in HPI  Review of systems reviewed from Patient History Form completed today and available in the patient's chart under the Media tab. Pain Assessment  Location of Pain: Knee  Location Modifiers: Right  Severity of Pain: 3  Quality of Pain: Sharp, Aching  Duration of Pain: Persistent  Frequency of Pain: Intermittent  Aggravating Factors: Walking  Limiting Behavior: Yes  Relieving Factors: Rest  Result of Injury: No  Work-Related Injury: No  Are there other pain locations you wish to document?: No    History reviewed. No pertinent past medical history. History reviewed. No pertinent surgical history. Family History   Problem Relation Age of Onset    Breast Cancer Paternal Aunt     Breast Cancer Paternal Aunt        Social History     Socioeconomic History    Marital status:      Spouse name: None    Number of children: None    Years of education: None    Highest education level: None   Tobacco Use    Smoking status: Never    Smokeless tobacco: Never   Vaping Use    Vaping Use: Unknown       No current outpatient medications on file. No current facility-administered medications for this visit.

## 2023-09-12 ENCOUNTER — TELEPHONE (OUTPATIENT)
Dept: ORTHOPEDIC SURGERY | Age: 73
End: 2023-09-12

## 2023-09-12 ENCOUNTER — OFFICE VISIT (OUTPATIENT)
Dept: ORTHOPEDIC SURGERY | Age: 73
End: 2023-09-12
Payer: MEDICARE

## 2023-09-12 VITALS — WEIGHT: 158 LBS | BODY MASS INDEX: 26.33 KG/M2 | HEIGHT: 65 IN

## 2023-09-12 DIAGNOSIS — G89.29 CHRONIC BILATERAL LOW BACK PAIN, UNSPECIFIED WHETHER SCIATICA PRESENT: Primary | ICD-10-CM

## 2023-09-12 DIAGNOSIS — M54.16 LUMBAR RADICULITIS: ICD-10-CM

## 2023-09-12 DIAGNOSIS — M51.36 DDD (DEGENERATIVE DISC DISEASE), LUMBAR: ICD-10-CM

## 2023-09-12 DIAGNOSIS — M54.50 CHRONIC BILATERAL LOW BACK PAIN, UNSPECIFIED WHETHER SCIATICA PRESENT: Primary | ICD-10-CM

## 2023-09-12 PROCEDURE — 99214 OFFICE O/P EST MOD 30 MIN: CPT | Performed by: PHYSICIAN ASSISTANT

## 2023-09-12 PROCEDURE — 1123F ACP DISCUSS/DSCN MKR DOCD: CPT | Performed by: PHYSICIAN ASSISTANT

## 2023-09-12 RX ORDER — IBUPROFEN 800 MG/1
800 TABLET ORAL 2 TIMES DAILY PRN
Qty: 60 TABLET | Refills: 0 | Status: SHIPPED | OUTPATIENT
Start: 2023-09-12

## 2023-09-12 NOTE — TELEPHONE ENCOUNTER
Patient contacted regarding Lumbar MRI WO CONTRAST approval and authorization is valid. Patient was notified and instructed to call to schedule at [address] [facility], [address] P: [phone]    Once completed, patient was instructed on scheduling a follow up appoint to review results.  TEST RESULTS WILL NOT BE GIVEN OVER THE PHONE. AN IN-OFFICE APPOINTMENT IS REQUIRED

## 2023-09-12 NOTE — PROGRESS NOTES
New Patient: Nathaly Dubois    9/12/2023     CHIEF COMPLAINT:    Chief Complaint   Patient presents with    Follow-up     LOW BACK PAIN/REF BY DR. Britney Melchor       HISTORY OF PRESENT ILLNESS:              The patient is a 68 y.o. female referred by Dr. Jaime Pugh for low back and leg pain. She reports a history of chronic dull aching low back pain periodically radiating into the bilateral buttocks, posterior lateral thighs. She feels her symptoms have been worsening over the last several months. Pain is increased with walking, standing or driving. Some relief with heat. Conservative care includes Tylenol, ibuprofen, chiropractics, PT. She actively sees Dr. Britney Melchor for underlying right knee issues. She currently denies any progressive numbness tingling or progressive extremity weakness. At times she reports a \"heaviness\" of her lower extremities. Denies any recent bowel or bladder dysfunction or saddle anesthesia. Denies any recent fevers chills or infections. Denies any recent injury or trauma. The pain assessment was noted & reviewed in the medical record today. Current/Past Treatment:   Physical Therapy: Yes, knee  Chiropractic: Yes and massage  Injection:   Cortisone-- knee  Medications:            NSAIDS: Ibuprofen            Muscle relaxer:              Steriods:              Neuropathic medications:              Opioids:            Other: Tylenol  Surgery/Consult:    Work Status/Functionality: Retired, from [address] History: Medical history form was reviewed today & scanned into the media tab  No past medical history on file. Past Surgical History:     No past surgical history on file. Current Medications:   No current outpatient medications on file. Allergies:  Naproxen, Shellfish allergy, and Shellfish-derived products  Social History:    reports that she has never smoked.  She has never used smokeless tobacco.  Family History:   Family History   Problem Relation Age of

## 2023-09-22 ENCOUNTER — HOSPITAL ENCOUNTER (OUTPATIENT)
Dept: PHYSICAL THERAPY | Age: 73
Setting detail: THERAPIES SERIES
Discharge: HOME | End: 2023-09-22
Payer: MEDICARE

## 2023-09-22 DIAGNOSIS — M54.50 CHRONIC BILATERAL LOW BACK PAIN, UNSPECIFIED WHETHER SCIATICA PRESENT: Primary | ICD-10-CM

## 2023-09-22 DIAGNOSIS — G89.29 CHRONIC PAIN OF RIGHT KNEE: ICD-10-CM

## 2023-09-22 DIAGNOSIS — G89.29 CHRONIC BILATERAL LOW BACK PAIN, UNSPECIFIED WHETHER SCIATICA PRESENT: Primary | ICD-10-CM

## 2023-09-22 DIAGNOSIS — M25.561 CHRONIC PAIN OF RIGHT KNEE: ICD-10-CM

## 2023-09-22 PROCEDURE — 97110 THERAPEUTIC EXERCISES: CPT | Performed by: PHYSICAL THERAPIST

## 2023-09-22 PROCEDURE — 97112 NEUROMUSCULAR REEDUCATION: CPT | Performed by: PHYSICAL THERAPIST

## 2023-09-22 PROCEDURE — 97161 PT EVAL LOW COMPLEX 20 MIN: CPT | Performed by: PHYSICAL THERAPIST

## 2023-09-22 NOTE — PLAN OF CARE
Continue with albuterol treatments as needed  Start zyrtec  Magic mouthwash as needed- do not swallow  Ok to continue motrin    Viral Upper Respiratory Illness (Child)  Your child has a viral upper respiratory illness (URI), which is another term for the common cold. The virus is contagious during the first few days. It is spread through the air by coughing, sneezing, or by direct contact (touching your sick child then touching your own eyes, nose, or mouth). Frequent handwashing will decrease risk of spread. Most viral illnesses resolve within 7 to 14 days with rest and simple home remedies. However, they may sometimes last up to 4 weeks. Antibiotics will not kill a virus and are generally not prescribed for this condition.    Home care  · Fluids: Fever increases water loss from the body. Encourage your child to drink lots of fluids to loosen lung secretions and make it easier to breathe. For infants under 1 year old, continue regular formula or breast feedings. Between feedings, give oral rehydration solution. This is available from drugstores and grocery stores without a prescription. For children over 1 year old, give plenty of fluids, such as water, juice, gelatin water, soda without caffeine, ginger ale, lemonade, or ice pops.  · Eating: If your child doesn't want to eat solid foods, it's OK for a few days, as long as he or she drinks lots of fluid.  · Rest: Keep children with fever at home resting or playing quietly until the fever is gone. Encourage frequent naps. Your child may return to day care or school when the fever is gone and he or she is eating well and feeling better.  · Sleep: Periods of sleeplessness and irritability are common. A congested child will sleep best with the head and upper body propped up on pillows or with the head of the bed frame raised on a 6-inch block.   · Cough: Coughing is a normal part of this illness. A cool mist humidifier at the bedside may be helpful. Be sure to clean  the humidifier every day to prevent mold. Over-the-counter cough and cold medicines have not proved to be any more helpful than a placebo (syrup with no medicine in it). In addition, these medicines can produce serious side effects, especially in infants under 2 years of age. Do not give over-the-counter cough and cold medicines to children under 6 years unless your healthcare provider has specifically advised you to do so. Also, dont expose your child to cigarette smoke. It can make the cough worse.  · Nasal congestion: Suction the nose of infants with a bulb syringe. You may put 2 to 3 drops of saltwater (saline) nose drops in each nostril before suctioning. This helps thin and remove secretions. Saline nose drops are available without a prescription. You can also use ¼ teaspoon of table salt dissolved in 1 cup of water.  · Fever: Use childrens acetaminophen for fever, fussiness, or discomfort, unless another medicine was prescribed. In infants over 6 months of age, you may use childrens ibuprofen or acetaminophen. (Note: If your child has chronic liver or kidney disease or has ever had a stomach ulcer or gastrointestinal bleeding, talk with your healthcare provider before using these medicines.) Aspirin should never be given to anyone younger than 18 years of age who is ill with a viral infection or fever. It may cause severe liver or brain damage.  · Preventing spread: Washing your hands before and after touching your sick child will help prevent a new infection. It will also help prevent the spread of this viral illness to yourself and other children.  Follow-up care  Follow up with your healthcare provider, or as advised.  When to seek medical advice  For a usually healthy child, call your child's healthcare provider right away if any of these occur:  · A fever, as follows:  ¨ Your child is 3 months old or younger and has a fever of 100.4°F (38°C) or higher. Get medical care right away. Fever in a young  baby can be a sign of a dangerous infection.  ¨ Your child is of any age and has repeated fevers above 104°F (40°C).  ¨ Your child is younger than 2 years of age and a fever of 100.4°F (38°C) continues for more than 1 day.  ¨ Your child is 2 years old or older and a fever of 100.4°F (38°C) continues for more than 3 days.  · Earache, sinus pain, stiff or painful neck, headache, repeated diarrhea, or vomiting.  · Unusual fussiness.  · A new rash appears.  · Your child is dehydrated, with one or more of these symptoms:  ¨ No tears when crying.  ¨ Sunken eyes or a dry mouth.  ¨ No wet diapers for 8 hours in infants.  ¨ Reduced urine output in older children.  Call 911, or get immediate medical care  Contact emergency services if any of these occur:  · Increased wheezing or difficulty breathing  · Unusual drowsiness or confusion  · Fast breathing, as follows:  ¨ Birth to 6 weeks: over 60 breaths per minute.  ¨ 6 weeks to 2 years: over 45 breaths per minute.  ¨ 3 to 6 years: over 35 breaths per minute.  ¨ 7 to 10 years: over 30 breaths per minute.  ¨ Older than 10 years: over 25 breaths per minute.  Date Last Reviewed: 9/13/2015  © 6169-5715 [employer]. [address] [place] [address], [place] [address]. All rights reserved. This information is not intended as a substitute for professional medical care. Always follow your healthcare professional's instructions.         ability to tolerate prolonged functional positions  Reduced ability or difficulty with changes of positions or transfers between positions  Reduced ability to maintain good posture and demonstrate good body mechanics with sitting, bending, and lifting  Reduced ability to sleep  Reduced ability or tolerance with driving and/or computer work  Reduced ability to perform lifting, reaching, carrying tasks  Reduced ability to squat  Reduced ability to forward bend  Reduced ability to ambulate prolonged functional periods/distances/surfaces  Reduced ability to ascend/descend stairs  reduced ability to kneel  difficulty with self care    Participation Restrictions:   Reduced participation in self care  Reduced participation in home management   Reduced participation in social activities  Reduced participation in sports/recreation    Classification :   Signs/symptoms consistent with Lumbar instability//motor control subgroup. Barriers to/and or personal factors that will affect rehab potential:   None noted  Age  Sex    Prognosis/Rehab Potential:  [] Excellent     [x] Good     [] Fair     [] Poor         Tolerance of evaluation/treatment:   [] Excellent     [x] Good     [] Fair     [] Poor      Physical Therapy Evaluation Complexity Justification  [x] A history of present problem and 1-2 personal factors and/or co-morbidities that impact the plan of care  [x] A total of 3 elements found upon examination of body systems using standardized tests and measures addressing any of the following: body structures, functions (impairments), activity limitations, and/or participation restrictions  [x] A clinical presentation with stable and/or uncomplicated characteristics   [x] Clinical decision making of LOW (91559 - Typically 20 minutes face-to-face) complexity using standardized patient assessment instrument and/or measurable assessment of functional outcome.     EVAL LOW [66953] (typically 20 minutes face-to-face)    GOALS

## 2023-09-22 NOTE — FLOWSHEET NOTE
[address] [place] [address] and Therapy [address] [place] [address]., [address] [facility], [address] [place] [address] office: [phone] fax: [phone]      Physical Therapy: TREATMENT/PROGRESS NOTE   Patient: Wilmer Dowell (33 y.o. female)   Treatment Date: 2023   :  1950 MRN: [id number]   Visit #: 1    Insurance: Payor: [facility] [place] MEDICARE / Plan: [address] / Product Type: *No Product type* /   Insurance ID: [id number] - (Medicare Managed)  Secondary Insurance (if applicable):    Treatment Diagnosis:     ICD-10-CM    1. Chronic bilateral low back pain, unspecified whether sciatica present  M54.50     G89.29       2.  Chronic pain of right knee  M25.561     G89.29          Medical Diagnosis:    Chronic bilateral low back pain, unspecified whether sciatica present [M54.50, G89.29]  DDD (degenerative disc disease), lumbar [M51.36]   Referring Physician: Sanjana Ramírez  PCP: Debby Delarosa MD                             Plan of care signed (Y/N): NO    Date of Patient follow up with Physician: per PA,MD     Progress Report/POC: EVAL today    POC update due (10 Rx/or 30 days whichever is less [address] [place] [address]): 10/22/2023     Visit # Insurance Allowable Auth Needed   1 BMN []Yes    [x]No     Latex Allergy:  [x]NO      []YES    Preferred Language for Healthcare:   [x]English       []other:    SUBJECTIVE EXAMINATION     Patient Report/Comments: see eval    OBJECTIVE EXAMINATION     Observation:     Test measurements: see eval     Test used Initial score 2023   Pain Summary VAS Patient Scale: VAS: 4-6/10 LB,   R knee 3-5/10 since shot,   L knee 2/10  occasional     Functional questionnaire [place] Back Pain Disability Scale 42% deficit    ROM        See eval                Strength  See eval                        RESTRICTIONS/PRECAUTIONS:     Exercises/Interventions:   ROM/stretches     SKTC [id number]    DKTC     Prayer stretch     Supine HS [id number]    Pelvic tilt [id number]    L

## 2023-09-25 ENCOUNTER — HOSPITAL ENCOUNTER (OUTPATIENT)
Dept: MRI IMAGING | Age: 73
Discharge: HOME | End: 2023-09-25
Payer: MEDICARE

## 2023-09-25 DIAGNOSIS — M54.50 CHRONIC BILATERAL LOW BACK PAIN, UNSPECIFIED WHETHER SCIATICA PRESENT: ICD-10-CM

## 2023-09-25 DIAGNOSIS — M51.36 DDD (DEGENERATIVE DISC DISEASE), LUMBAR: ICD-10-CM

## 2023-09-25 DIAGNOSIS — G89.29 CHRONIC BILATERAL LOW BACK PAIN, UNSPECIFIED WHETHER SCIATICA PRESENT: ICD-10-CM

## 2023-09-25 PROCEDURE — 72148 MRI LUMBAR SPINE W/O DYE: CPT

## 2023-09-29 ENCOUNTER — HOSPITAL ENCOUNTER (OUTPATIENT)
Dept: PHYSICAL THERAPY | Age: 73
Setting detail: THERAPIES SERIES
Discharge: HOME | End: 2023-09-29
Payer: MEDICARE

## 2023-09-29 PROCEDURE — 97112 NEUROMUSCULAR REEDUCATION: CPT | Performed by: PHYSICAL THERAPIST

## 2023-09-29 PROCEDURE — 97110 THERAPEUTIC EXERCISES: CPT | Performed by: PHYSICAL THERAPIST

## 2023-09-29 NOTE — FLOWSHEET NOTE
Met: [] Not Met: [] Adjusted  Overall Progression Towards Functional goals/ Treatment Progress Update:  [] Patient is progressing as expected towards functional goals listed. [] Progression is slowed due to complexities/Impairments listed. [] Progression has been slowed due to co-morbidities. [x] Plan just implemented, too soon (<30days) to assess goals progression   [] Goals require adjustment due to lack of progress  [] Patient is not progressing as expected and requires additional follow up with physician  [] Other:     CHARGE CAPTURE     CPT Code (TIMED) minutes # CPT Code (UNTIMED) #     [x] Therex (20160)   2  [] EVAL:     [x] Neuromusc. Re-ed (28027)  1  [] Re-Eval (32857)     [] Manual (96203)    [] Estim Unattended (78476)     [] Ther. Act (78246)    [] Orbie Sor. Traction (44090)     [] Gait (68543)    [] Dry Needle 1-2 muscle (09703)     [] Aquatic Therex (85543)    [] Dry Needle 3+ muscle (35476)     [] Iontophoresis (07866)    [] VASO (23997)     [] Ultrasound (89711)    [] Group Therapy (49428)     [] Estim Attended (29679)    [] Other: Total Timed Code Tx Minutes 40        Total Treatment Minutes 200-255        Charge Justification:  (50916) THERAPEUTIC EXERCISE - Provided verbal/tactile cueing for activities related to strengthening, flexibility, endurance, ROM performed to prevent loss of range of motion, maintain or improve muscular strength or increase flexibility, following either an injury or surgery. (95805) [facility]- Reviewed/Progressed HEP activities related to strengthening, flexibility, endurance, ROM performed to prevent loss of range of motion, maintain or improve muscular strength or increase flexibility, following either an injury or surgery.   (64003) NEUROMUSCULAR RE-EDUCATION- Therapeutic procedure, 1 or more areas, each 15 minutes; neuromuscular reeducation of movement, balance, coordination, kinesthetic sense, posture, and/or proprioception for sitting and/or

## 2023-10-03 ENCOUNTER — OFFICE VISIT (OUTPATIENT)
Dept: ORTHOPEDIC SURGERY | Age: 73
End: 2023-10-03
Payer: MEDICARE

## 2023-10-03 VITALS — BODY MASS INDEX: 26.33 KG/M2 | HEIGHT: 65 IN | WEIGHT: 158 LBS

## 2023-10-03 DIAGNOSIS — M54.41 CHRONIC BILATERAL LOW BACK PAIN WITH BILATERAL SCIATICA: Primary | ICD-10-CM

## 2023-10-03 DIAGNOSIS — M48.062 LUMBAR STENOSIS WITH NEUROGENIC CLAUDICATION: ICD-10-CM

## 2023-10-03 DIAGNOSIS — G89.29 CHRONIC BILATERAL LOW BACK PAIN WITH BILATERAL SCIATICA: Primary | ICD-10-CM

## 2023-10-03 DIAGNOSIS — M54.42 CHRONIC BILATERAL LOW BACK PAIN WITH BILATERAL SCIATICA: Primary | ICD-10-CM

## 2023-10-03 DIAGNOSIS — M51.36 DDD (DEGENERATIVE DISC DISEASE), LUMBAR: ICD-10-CM

## 2023-10-03 PROCEDURE — 1123F ACP DISCUSS/DSCN MKR DOCD: CPT | Performed by: PHYSICIAN ASSISTANT

## 2023-10-03 PROCEDURE — 99214 OFFICE O/P EST MOD 30 MIN: CPT | Performed by: PHYSICIAN ASSISTANT

## 2023-10-03 NOTE — PROGRESS NOTES
FOLLOW UP SPINE    10/3/2023     CHIEF COMPLAINT:    Chief Complaint   Patient presents with    Follow-up     MRI RESULTS       HISTORY OF PRESENT ILLNESS:              The patient is a 68 y.o. female initially referred by Dr. Dave Welch here to review lumbar MRI for low back and leg pain. She reports a history of chronic dull aching low back pain periodically radiating into the bilateral buttocks, posterior lateral thighs. She feels her symptoms have been worsening over the last several months. Pain is increased with walking, standing or driving. Some relief with heat. Conservative care includes: Tylenol, ibuprofen, chiropractics, PT. she currently reports 30% improvement overall. She actively sees Dr. Danielle Alicia for underlying right knee issues. She currently denies any progressive numbness tingling or progressive extremity weakness. At times she reports a \"heaviness\" of her lower extremities. Denies any recent bowel or bladder dysfunction or saddle anesthesia. Denies any recent fevers chills or infections. Denies any recent injury or trauma. The pain assessment was noted & reviewed in the medical record today. Current/Past Treatment:   Physical Therapy: Yes   Chiropractic: Yes and massage  Injection:   Cortisone-- knee  Medications:            NSAIDS: Ibuprofen            Muscle relaxer:              Steriods:              Neuropathic medications:              Opioids:            Other: Tylenol  Surgery/Consult:    Work Status/Functionality: Retired, from [place]    Past Medical History: Medical history form was reviewed today & scanned into the media tab  No past medical history on file. Past Surgical History:     No past surgical history on file.   Current Medications:     Current Outpatient Medications:     ibuprofen (ADVIL;MOTRIN) 800 MG tablet, Take 1 tablet by mouth 2 times daily as needed for Pain, Disp: 60 tablet, Rfl: 0  Allergies:  Naproxen, Shellfish allergy, and

## 2023-10-06 ENCOUNTER — HOSPITAL ENCOUNTER (OUTPATIENT)
Dept: PHYSICAL THERAPY | Age: 73
Setting detail: THERAPIES SERIES
Discharge: HOME | End: 2023-10-06
Payer: MEDICARE

## 2023-10-06 PROCEDURE — 97110 THERAPEUTIC EXERCISES: CPT | Performed by: PHYSICAL THERAPIST

## 2023-10-06 PROCEDURE — 97112 NEUROMUSCULAR REEDUCATION: CPT | Performed by: PHYSICAL THERAPIST

## 2023-10-06 NOTE — FLOWSHEET NOTE
Status: [] Progressing: [] Met: [] Not Met: [] Adjusted  Overall Progression Towards Functional goals/ Treatment Progress Update:  [] Patient is progressing as expected towards functional goals listed. [] Progression is slowed due to complexities/Impairments listed. [] Progression has been slowed due to co-morbidities. [x] Plan just implemented, too soon (<30days) to assess goals progression   [] Goals require adjustment due to lack of progress  [] Patient is not progressing as expected and requires additional follow up with physician  [] Other:     CHARGE CAPTURE     CPT Code (TIMED) minutes # CPT Code (UNTIMED) #     [x] Therex (78707)   2  [] EVAL:     [x] Neuromusc. Re-ed (37130)  1  [] Re-Eval (53770)     [] Manual (20485)    [] Estim Unattended (86958)     [] Ther. Act (88738)    [] Daniele Mater. Traction (82462)     [] Gait (50709)    [] Dry Needle 1-2 muscle (38551)     [] Aquatic Therex (47235)    [] Dry Needle 3+ muscle (51270)     [] Iontophoresis (39758)    [] VASO (41908)     [] Ultrasound (99239)    [] Group Therapy (12043)     [] Estim Attended (90208)    [] Other: Total Timed Code Tx Minutes 40        Total Treatment Minutes 200-255        Charge Justification:  (59285) THERAPEUTIC EXERCISE - Provided verbal/tactile cueing for activities related to strengthening, flexibility, endurance, ROM performed to prevent loss of range of motion, maintain or improve muscular strength or increase flexibility, following either an injury or surgery. (87391) [address] [facility]- Reviewed/Progressed HEP activities related to strengthening, flexibility, endurance, ROM performed to prevent loss of range of motion, maintain or improve muscular strength or increase flexibility, following either an injury or surgery.   (98630) NEUROMUSCULAR RE-EDUCATION- Therapeutic procedure, 1 or more areas, each 15 minutes; neuromuscular reeducation

## 2023-10-20 ENCOUNTER — HOSPITAL ENCOUNTER (OUTPATIENT)
Dept: PHYSICAL THERAPY | Age: 73
Setting detail: THERAPIES SERIES
Discharge: HOME | End: 2023-10-20
Payer: MEDICARE

## 2023-10-20 PROCEDURE — 97112 NEUROMUSCULAR REEDUCATION: CPT | Performed by: PHYSICAL THERAPIST

## 2023-10-20 PROCEDURE — 97110 THERAPEUTIC EXERCISES: CPT | Performed by: PHYSICAL THERAPIST

## 2023-10-20 NOTE — FLOWSHEET NOTE
[facility] and Therapy [address] [facility], [address] [place] [address] office: [phone] fax: [phone]      Physical Therapy: TREATMENT/PROGRESS NOTE   Patient: Leroy Davis (61 y.o. female)   Treatment Date: 10/20/2023   :  1950 MRN: [id number]   Visit #: 4    Insurance: Payor: [facility] / Plan: [address] [facility] / Product Type: *No Product type* /   Insurance ID: [id number] - (Medicare Managed)  Secondary Insurance (if applicable):    Treatment Diagnosis:     ICD-10-CM    1. Chronic bilateral low back pain, unspecified whether sciatica present  M54.50     G89.29       2. Chronic pain of right knee  M25.561     G89.29          Medical Diagnosis:    Chronic bilateral low back pain, unspecified whether sciatica present [M54.50, G89.29]  DDD (degenerative disc disease), lumbar [M51.36]   Referring Physician: Kristina Cage  PCP: Thaddeus Lerma MD                             Plan of care signed (Y/N): yes    Date of Patient follow up with Physician: per PA,MD     Progress Report/POC: NO  NPV    POC update due (10 Rx/or 30 days whichever is less [address] [place] [address]): 10/22/2023     Visit # Insurance Allowable Auth Needed   4 BMN []Yes    [x]No     Latex Allergy:  [x]NO      []YES    Preferred Language for Healthcare:   [x]English       []other:    SUBJECTIVE EXAMINATION     Patient Report/Comments:  very sore several days post last session. Busy with grand daughter moving in. Decreased HEP compliance. Waking daily. Very active in home but have to rest after about 30  min.   Use heat to decrease c/o     OBJECTIVE EXAMINATION     Observation:     Test measurements: see eval     Test used Initial score 10/20/2023   Pain Summary VAS Patient Scale: VAS: 4-8/10 LB,   R knee 3-8/10 since shot,   L knee 2/10  occasional  10/20   Functional questionnaire [place] Back Pain Disability Scale 42% deficit 40% deficit   ROM        See eval

## 2023-10-26 ENCOUNTER — HOSPITAL ENCOUNTER (OUTPATIENT)
Dept: PHYSICAL THERAPY | Age: 73
Setting detail: THERAPIES SERIES
Discharge: HOME | End: 2023-10-26
Payer: MEDICARE

## 2023-10-26 PROCEDURE — 97110 THERAPEUTIC EXERCISES: CPT | Performed by: PHYSICAL THERAPIST

## 2023-10-26 PROCEDURE — 97112 NEUROMUSCULAR REEDUCATION: CPT | Performed by: PHYSICAL THERAPIST

## 2023-10-26 NOTE — PLAN OF CARE
[address] [place] [address] and Therapy [address] [facility]., [address] [facility] [address] office: [phone] fax: [phone]    Physical Therapy Re-Certification Plan of Care    Dear Patsy Epley Johanna Milliner  ,    We had the pleasure of treating the following patient for physical therapy services at [address] [place] [address]. A summary of our findings can be found in the updated assessment below. This includes our plan of care. If you have any questions or concerns regarding these findings, please do not hesitate to contact me at the office phone number checked above. Thank you for the referral.     Physician Signature:________________________________Date:__________________  By signing above (or electronic signature), therapist's plan is approved by physician      Functional Outcome: [place] improved  10% to 32% deficit  Rich Harris 1950 continues to present with functional deficits in ROM and strength symmetry  limiting ability with ADLs and heavy home activity . During therapy this date, patient required visual cueing, verbal cueing, tactile cueing, and progression of exercises and program for exercise progression. Patient will continue to benefit from ongoing evaluation and advanced clinical decision from a Physical Therapist to improve ROM, muscle strength, and ADL status to safely return to PLOF without symptoms or restrictions. Overall Response to Treatment:   [x]Patient is responding well to treatment and improvement is noted with regards to goals   []Patient should continue to improve in reasonable time if they continue HEP   []Patient has plateaued and is no longer responding to skilled PT intervention    []Patient is getting worse and would benefit from return to referring MD   []Patient unable to adhere to initial POC   []Other: Total Visits: 5     Recommendation:    [x] Continue PT 1x / wk for 4 weeks.    [] Hold PT, pending MD visit   []

## 2023-11-17 ENCOUNTER — HOSPITAL ENCOUNTER (OUTPATIENT)
Dept: PHYSICAL THERAPY | Age: 73
Setting detail: THERAPIES SERIES
Discharge: HOME | End: 2023-11-17
Payer: MEDICARE

## 2023-11-17 PROCEDURE — 97110 THERAPEUTIC EXERCISES: CPT | Performed by: PHYSICAL THERAPIST

## 2023-11-17 PROCEDURE — 97112 NEUROMUSCULAR REEDUCATION: CPT | Performed by: PHYSICAL THERAPIST

## 2023-11-17 NOTE — FLOWSHEET NOTE
[address] [facility] and Therapy [address] [place] [address]., [address] [facility], [address] [place] [address] office: [phone] fax: [phone]        Physical Therapy: TREATMENT/PROGRESS NOTE   Patient: Renato Verde (55 y.o. female)   Treatment Date: 2023   :  1950 MRN: [id number]   Visit #: 6    Insurance: Payor: Samantha Engel / Plan: [address] [facility] / Product Type: *No Product type* /   Insurance ID: [id number] - (Medicare Managed)  Secondary Insurance (if applicable):    Treatment Diagnosis:     ICD-10-CM    1. Chronic bilateral low back pain, unspecified whether sciatica present  M54.50     G89.29       2. Chronic pain of right knee  M25.561     G89.29          Medical Diagnosis:    Chronic bilateral low back pain, unspecified whether sciatica present [M54.50, G89.29]  DDD (degenerative disc disease), lumbar [M51.36]   Referring Physician: Scott Bower  PCP: Inge Lancaster MD                             Plan of care signed (Y/N): yes    Date of Patient follow up with Physician: per PA,MD     Progress Report/POC: NO  1    POC update due (10 Rx/or 30 days whichever is less [address] [place] [address]): 2023    Visit # Insurance Allowable Auth Needed   6 BMN []Yes    [x]No     Latex Allergy:  [x]NO      []YES    Preferred Language for Healthcare:   [x]English       []other:    SUBJECTIVE EXAMINATION     Patient Report/Comments:  do note improvement.   HEP compliance better but not daily     OBJECTIVE EXAMINATION     Observation:     Test measurements: see eval     Test used Initial score 10/26/2023 2023   Pain Summary VAS Patient Scale: VAS: 4-8/10 LB,   R knee 3-8/10 since shot,   L knee 2/10  occasional  LBP 1-8/10    R knee 0-8/10    L knee 0-6/10    Functional questionnaire [place] Back Pain Disability Scale 42% deficit 32% deficit           10/26/23 AROM L AROM R Notes PROM L PROM R Notes                        LUMBAR Flexion full           Extension [facility]

## 2024-04-15 ENCOUNTER — HOSPITAL ENCOUNTER (OUTPATIENT)
Age: 74
Setting detail: OUTPATIENT SURGERY
Discharge: HOME | End: 2024-04-15
Attending: INTERNAL MEDICINE | Admitting: INTERNAL MEDICINE
Payer: MEDICARE

## 2024-04-15 ENCOUNTER — ANESTHESIA (OUTPATIENT)
Dept: ENDOSCOPY | Age: 74
End: 2024-04-15
Payer: MEDICARE

## 2024-04-15 ENCOUNTER — ANESTHESIA EVENT (OUTPATIENT)
Dept: ENDOSCOPY | Age: 74
End: 2024-04-15
Payer: MEDICARE

## 2024-04-15 VITALS
RESPIRATION RATE: 16 BRPM | SYSTOLIC BLOOD PRESSURE: 145 MMHG | BODY MASS INDEX: 25.16 KG/M2 | OXYGEN SATURATION: 100 % | WEIGHT: 151 LBS | HEART RATE: 62 BPM | DIASTOLIC BLOOD PRESSURE: 72 MMHG | HEIGHT: 65 IN | TEMPERATURE: 96.9 F

## 2024-04-15 PROCEDURE — 7100000011 HC PHASE II RECOVERY - ADDTL 15 MIN: Performed by: INTERNAL MEDICINE

## 2024-04-15 PROCEDURE — 3700000000 HC ANESTHESIA ATTENDED CARE: Performed by: INTERNAL MEDICINE

## 2024-04-15 PROCEDURE — 3609027000 HC COLONOSCOPY: Performed by: INTERNAL MEDICINE

## 2024-04-15 PROCEDURE — 7100000010 HC PHASE II RECOVERY - FIRST 15 MIN: Performed by: INTERNAL MEDICINE

## 2024-04-15 PROCEDURE — 2709999900 HC NON-CHARGEABLE SUPPLY: Performed by: INTERNAL MEDICINE

## 2024-04-15 PROCEDURE — 6360000002 HC RX W HCPCS

## 2024-04-15 PROCEDURE — 2580000003 HC RX 258: Performed by: ANESTHESIOLOGY

## 2024-04-15 PROCEDURE — 3700000001 HC ADD 15 MINUTES (ANESTHESIA): Performed by: INTERNAL MEDICINE

## 2024-04-15 RX ORDER — VALSARTAN AND HYDROCHLOROTHIAZIDE 320; 25 MG/1; MG/1
1 TABLET, FILM COATED ORAL DAILY
COMMUNITY
Start: 2023-10-31 | End: 2024-04-28

## 2024-04-15 RX ORDER — SODIUM CHLORIDE, SODIUM LACTATE, POTASSIUM CHLORIDE, CALCIUM CHLORIDE 600; 310; 30; 20 MG/100ML; MG/100ML; MG/100ML; MG/100ML
INJECTION, SOLUTION INTRAVENOUS CONTINUOUS
Status: DISCONTINUED | OUTPATIENT
Start: 2024-04-15 | End: 2024-04-15 | Stop reason: HOSPADM

## 2024-04-15 RX ORDER — FEXOFENADINE HCL 180 MG/1
180 TABLET ORAL DAILY
COMMUNITY

## 2024-04-15 RX ORDER — PROPOFOL 10 MG/ML
INJECTION, EMULSION INTRAVENOUS CONTINUOUS PRN
Status: DISCONTINUED | OUTPATIENT
Start: 2024-04-15 | End: 2024-04-15 | Stop reason: SDUPTHER

## 2024-04-15 RX ORDER — SODIUM CHLORIDE 0.9 % (FLUSH) 0.9 %
5-40 SYRINGE (ML) INJECTION PRN
Status: DISCONTINUED | OUTPATIENT
Start: 2024-04-15 | End: 2024-04-15 | Stop reason: HOSPADM

## 2024-04-15 RX ORDER — ROSUVASTATIN CALCIUM 5 MG/1
5 TABLET, COATED ORAL DAILY
COMMUNITY

## 2024-04-15 RX ORDER — LIDOCAINE HYDROCHLORIDE 10 MG/ML
1 INJECTION, SOLUTION EPIDURAL; INFILTRATION; INTRACAUDAL; PERINEURAL
Status: DISCONTINUED | OUTPATIENT
Start: 2024-04-15 | End: 2024-04-15 | Stop reason: HOSPADM

## 2024-04-15 RX ORDER — OXYMETAZOLINE HCL 0.05 %
500 AEROSOL, SPRAY WITH PUMP (ML) NASAL NIGHTLY
COMMUNITY

## 2024-04-15 RX ORDER — SODIUM CHLORIDE 0.9 % (FLUSH) 0.9 %
5-40 SYRINGE (ML) INJECTION EVERY 12 HOURS SCHEDULED
Status: DISCONTINUED | OUTPATIENT
Start: 2024-04-15 | End: 2024-04-15 | Stop reason: HOSPADM

## 2024-04-15 RX ORDER — ESTRADIOL 0.1 MG/G
2 CREAM VAGINAL DAILY
COMMUNITY

## 2024-04-15 RX ORDER — LIDOCAINE HYDROCHLORIDE 20 MG/ML
INJECTION, SOLUTION INTRAVENOUS PRN
Status: DISCONTINUED | OUTPATIENT
Start: 2024-04-15 | End: 2024-04-15 | Stop reason: SDUPTHER

## 2024-04-15 RX ORDER — POTASSIUM CHLORIDE 750 MG/1
10 CAPSULE, EXTENDED RELEASE ORAL 2 TIMES DAILY
COMMUNITY

## 2024-04-15 RX ADMIN — PROPOFOL 120 MCG/KG/MIN: 10 INJECTION, EMULSION INTRAVENOUS at 11:28

## 2024-04-15 RX ADMIN — PROPOFOL 50 MG: 10 INJECTION, EMULSION INTRAVENOUS at 11:29

## 2024-04-15 RX ADMIN — LIDOCAINE HYDROCHLORIDE 60 MG: 20 INJECTION, SOLUTION INTRAVENOUS at 11:28

## 2024-04-15 RX ADMIN — SODIUM CHLORIDE, POTASSIUM CHLORIDE, SODIUM LACTATE AND CALCIUM CHLORIDE: 600; 310; 30; 20 INJECTION, SOLUTION INTRAVENOUS at 10:43

## 2024-04-15 ASSESSMENT — PAIN SCALES - GENERAL
PAINLEVEL_OUTOF10: 0
PAINLEVEL_OUTOF10: 0

## 2024-04-15 ASSESSMENT — PAIN - FUNCTIONAL ASSESSMENT
PAIN_FUNCTIONAL_ASSESSMENT: 0-10
PAIN_FUNCTIONAL_ASSESSMENT: 0-10

## 2024-04-15 NOTE — ANESTHESIA POSTPROCEDURE EVALUATION
Department of Anesthesiology  Postprocedure Note    Patient: Kathy Vega  MRN: [id number]  YOB: 1950  Date of evaluation: 4/15/2024    Procedure Summary       Date: 04/15/24 Room / Location: [place] [address] / [facility]    Anesthesia Start: 1122 Anesthesia Stop: 1144    Procedure: COLONOSCOPY Diagnosis:       Screen for colon cancer      (Screen for colon cancer [Z12.11])    Surgeons: Louis Jaime MD Responsible Provider: Avtar Healy MD    Anesthesia Type: MAC ASA Status: 2            Anesthesia Type: No value filed.    Kalina Phase I: Kalina Score: 10    Kalina Phase II: Kalina Score: 10    Anesthesia Post Evaluation    Patient location during evaluation: PACU  Patient participation: complete - patient participated  Level of consciousness: awake  Pain score: 0  Airway patency: patent  Nausea & Vomiting: no nausea  Cardiovascular status: hemodynamically stable  Respiratory status: acceptable  Hydration status: stable  Pain management: satisfactory to patient    No notable events documented.

## 2024-04-15 NOTE — PROGRESS NOTES
Ambulatory Surgery/Procedure Discharge Note    Vitals:    04/15/24 1201   BP: 134/69   Pulse: 62   Resp: 16   Temp:    SpO2: 100%     Pt meets discharge criteria per Kalina score.  In: 800 [I.V.:800]  Out: -     Restroom use offered before discharge.  Yes    Pain assessment:  none  Pain Level: 0    Pt and S.O./family states \"ready to go home\". Pt alert and oriented x4. IV removed. Denies N/V or pain.  Pt tolerating po intake. Discharge instructions given to pt and  with pt permission. Pt and  verbalized understanding of all instructions. Left with all belongings, and discharge instructions.     Patient discharged to home/self care. Patient discharged via wheel chair by transporter to waiting family/S.O.       4/15/2024 12:05 PM

## 2024-04-15 NOTE — DISCHARGE INSTRUCTIONS
receive a survey in the mail regarding your care. We would appreciate you taking a few minutes of your time to complete this survey.

## 2024-04-15 NOTE — ANESTHESIA PRE PROCEDURE
for input(s): \"POCGLU\", \"POCNA\", \"POCK\", \"POCCL\", \"POCBUN\", \"POCHEMO\", \"POCHCT\" in the last 72 hours.    Coags: No results found for: \"PROTIME\", \"INR\", \"APTT\"    HCG (If Applicable): No results found for: \"PREGTESTUR\", \"PREGSERUM\", \"HCG\", \"HCGQUANT\"     ABGs: No results found for: \"PHART\", \"PO2ART\", \"HZC3AEW\", \"DEG7LXM\", \"BEART\", \"V3PMZILN\"     Type & Screen (If Applicable):  No results found for: \"LABABO\", \"LABRH\"    Drug/Infectious Status (If Applicable):  No results found for: \"HIV\", \"HEPCAB\"    COVID-19 Screening (If Applicable): No results found for: \"COVID19\"        Anesthesia Evaluation  Patient summary reviewed and Nursing notes reviewed  Airway: Mallampati: III  TM distance: >3 FB   Neck ROM: full  Mouth opening: > = 3 FB   Dental: normal exam         Pulmonary:Negative Pulmonary ROS and normal exam                               Cardiovascular:  Exercise tolerance: good (>4 METS)  (+) hypertension:                  Neuro/Psych:   Negative Neuro/Psych ROS              GI/Hepatic/Renal: Neg GI/Hepatic/Renal ROS            Endo/Other: Negative Endo/Other ROS                    Abdominal:             Vascular: negative vascular ROS.         Other Findings:             Anesthesia Plan      MAC     ASA 2       Induction: intravenous.      Anesthetic plan and risks discussed with patient.      Plan discussed with CRNA.    Attending anesthesiologist reviewed and agrees with Preprocedure content                MOHAN MORRIS MD   4/15/2024

## 2024-04-15 NOTE — H&P
limits    Lungs:  CTA bilat anteriorly,  Normal effort    Abdomen:   soft, NT, ND      ASA Grade:  ASA 2 - Patient with mild systemic disease with no functional limitations    Mallampati Class: 2      ASSESSMENT AND PLAN:    1.  Patient is a 74 y.o. female here for EGD/Colonoscopy.   2.  Procedure options, risks and benefits reviewed with patient.  Patient expresses understanding and wishes to proceed.    Louis Jaime MD,   [place]  4/15/2024    Please note that some or all of this record was generated using voice recognition software. If there are any questions about the content of this document, please contact the author as some errors in translation may have occurred.

## 2024-04-15 NOTE — OP NOTE
Colonoscopy Procedure  Note          Patient: Kathy Vega  : 1950  CRN:  @CRN@    Procedure: Colonoscopy     Date:  4/15/2024    Surgeon:  Louis Jaime MD, MD    Referring Physician:  Dusty Oviedo MD    Preoperative Diagnosis:  Screen for colon cancer [Z12.11]    Postoperative Diagnosis:  left sided diverticula, internal hemorrhoids    Anesthesia:  MAC    EBL: Minimal to none.    Indications: This is a 74 y.o. year old female who presents today with screening for colon cancer.      Procedure:   An informed consent was obtained from the patient after explanation of indications, benefits, possible risks and complications of the procedure.  The patient was then taken to the endoscopy suite, placed in the left lateral decubitus position, and the above IV anesthesia was administered.      Digital rectal examination was performed.  With the patient in the left lateral decubitus position the endoscope was inserted through the anorectal area into the rectum. The scope was then advanced through the length of the colon to the cecum, which was identified by the ileocecal valve and appendiceal orifice.  The quality of preparation was adequate.  The scope was carefully withdrawn and the mucosa was fully inspected including retroflexion in the rectum. Findings and interventions are described below.    Left sided diverticula  Small internal hemorrhoids    The patient tolerated the procedure well and was taken to the PACU in good condition.  There were no immediate complications.      Impression:  Normal colonoscopy to the cecum with no evidence of neoplasia, diverticular disease or mucosal abnormality.    Recommendations:   no follow up secondary to age    Louis Jaime MD, MD   [facility]  4/15/2024      Please note that some or all of this record was generated using voice recognition software. If there are any questions about the content of this document, please contact the author as some errors in

## 2024-08-30 ENCOUNTER — HOSPITAL ENCOUNTER (OUTPATIENT)
Dept: MAMMOGRAPHY | Age: 74
Discharge: HOME | End: 2024-08-30
Payer: MEDICARE

## 2024-08-30 VITALS — WEIGHT: 151 LBS | HEIGHT: 65 IN | BODY MASS INDEX: 25.16 KG/M2

## 2024-08-30 DIAGNOSIS — Z12.31 VISIT FOR SCREENING MAMMOGRAM: ICD-10-CM

## 2024-08-30 PROCEDURE — 77063 BREAST TOMOSYNTHESIS BI: CPT

## 2025-09-03 ENCOUNTER — HOSPITAL ENCOUNTER (OUTPATIENT)
Dept: MAMMOGRAPHY | Age: 75
Discharge: HOME | End: 2025-09-03
Payer: MEDICARE

## 2025-09-03 VITALS — BODY MASS INDEX: 25.83 KG/M2 | HEIGHT: 65 IN | WEIGHT: 155 LBS

## 2025-09-03 DIAGNOSIS — Z12.31 VISIT FOR SCREENING MAMMOGRAM: ICD-10-CM

## 2025-09-03 PROCEDURE — 77067 SCR MAMMO BI INCL CAD: CPT

## (undated) DEVICE — CANNULA SAMP CO2 AD GRN 7FT CO2 AND 7FT O2 TBNG UNIV CONN